# Patient Record
Sex: MALE | Race: WHITE | Employment: OTHER | URBAN - METROPOLITAN AREA
[De-identification: names, ages, dates, MRNs, and addresses within clinical notes are randomized per-mention and may not be internally consistent; named-entity substitution may affect disease eponyms.]

---

## 2018-04-10 ENCOUNTER — HOSPITAL ENCOUNTER (OUTPATIENT)
Dept: GENERAL RADIOLOGY | Age: 73
Discharge: HOME | End: 2018-04-12
Payer: MEDICARE

## 2018-04-10 ENCOUNTER — HOSPITAL ENCOUNTER (OUTPATIENT)
Age: 73
Discharge: HOME | End: 2018-04-12
Payer: MEDICARE

## 2018-04-10 DIAGNOSIS — M54.40 ACUTE RIGHT-SIDED LOW BACK PAIN WITH SCIATICA, SCIATICA LATERALITY UNSPECIFIED: ICD-10-CM

## 2018-04-10 PROCEDURE — 72110 X-RAY EXAM L-2 SPINE 4/>VWS: CPT

## 2018-05-04 ENCOUNTER — APPOINTMENT (OUTPATIENT)
Dept: CT IMAGING | Age: 73
DRG: 291 | End: 2018-05-04
Payer: MEDICARE

## 2018-05-04 ENCOUNTER — HOSPITAL ENCOUNTER (INPATIENT)
Age: 73
LOS: 1 days | Discharge: HOME | DRG: 291 | End: 2018-05-07
Attending: EMERGENCY MEDICINE | Admitting: INTERNAL MEDICINE
Payer: MEDICARE

## 2018-05-04 DIAGNOSIS — N17.9 AKI (ACUTE KIDNEY INJURY) (HCC): ICD-10-CM

## 2018-05-04 DIAGNOSIS — R77.8 ELEVATED TROPONIN: ICD-10-CM

## 2018-05-04 DIAGNOSIS — R07.89 OTHER CHEST PAIN: Primary | ICD-10-CM

## 2018-05-04 DIAGNOSIS — R06.00 DYSPNEA, UNSPECIFIED TYPE: ICD-10-CM

## 2018-05-04 PROBLEM — R07.9 CHEST PAIN: Status: ACTIVE | Noted: 2018-05-04

## 2018-05-04 LAB
ANION GAP SERPL CALCULATED.3IONS-SCNC: 15 MMOL/L (ref 7–16)
BASOPHILS ABSOLUTE: 0.07 E9/L (ref 0–0.2)
BASOPHILS RELATIVE PERCENT: 1.1 % (ref 0–2)
BUN BLDV-MCNC: 27 MG/DL (ref 8–23)
CALCIUM SERPL-MCNC: 9.4 MG/DL (ref 8.6–10.2)
CHLORIDE BLD-SCNC: 97 MMOL/L (ref 98–107)
CK MB: 2.6 NG/ML (ref 0–7.7)
CO2: 26 MMOL/L (ref 22–29)
CREAT SERPL-MCNC: 1.6 MG/DL (ref 0.7–1.2)
D DIMER: 474 NG/ML DDU
EKG ATRIAL RATE: 69 BPM
EKG P AXIS: 94 DEGREES
EKG P-R INTERVAL: 202 MS
EKG Q-T INTERVAL: 438 MS
EKG QRS DURATION: 150 MS
EKG QTC CALCULATION (BAZETT): 469 MS
EKG R AXIS: -63 DEGREES
EKG T AXIS: -26 DEGREES
EKG VENTRICULAR RATE: 69 BPM
EOSINOPHILS ABSOLUTE: 0.32 E9/L (ref 0.05–0.5)
EOSINOPHILS RELATIVE PERCENT: 4.9 % (ref 0–6)
GFR AFRICAN AMERICAN: 51
GFR NON-AFRICAN AMERICAN: 42 ML/MIN/1.73
GLUCOSE BLD-MCNC: 115 MG/DL (ref 74–109)
HCT VFR BLD CALC: 41.2 % (ref 37–54)
HEMOGLOBIN: 13.5 G/DL (ref 12.5–16.5)
IMMATURE GRANULOCYTES #: 0.02 E9/L
IMMATURE GRANULOCYTES %: 0.3 % (ref 0–5)
LYMPHOCYTES ABSOLUTE: 0.59 E9/L (ref 1.5–4)
LYMPHOCYTES RELATIVE PERCENT: 9.1 % (ref 20–42)
MAGNESIUM: 1.5 MG/DL (ref 1.6–2.6)
MCH RBC QN AUTO: 30.1 PG (ref 26–35)
MCHC RBC AUTO-ENTMCNC: 32.8 % (ref 32–34.5)
MCV RBC AUTO: 91.8 FL (ref 80–99.9)
METER GLUCOSE: 105 MG/DL (ref 70–110)
METER GLUCOSE: 119 MG/DL (ref 70–110)
MONOCYTES ABSOLUTE: 0.76 E9/L (ref 0.1–0.95)
MONOCYTES RELATIVE PERCENT: 11.7 % (ref 2–12)
NEUTROPHILS ABSOLUTE: 4.74 E9/L (ref 1.8–7.3)
NEUTROPHILS RELATIVE PERCENT: 72.9 % (ref 43–80)
PDW BLD-RTO: 13.4 FL (ref 11.5–15)
PLATELET # BLD: 190 E9/L (ref 130–450)
PMV BLD AUTO: 10.1 FL (ref 7–12)
POTASSIUM REFLEX MAGNESIUM: 4.2 MMOL/L (ref 3.5–5)
PRO-BNP: 1540 PG/ML (ref 0–125)
RBC # BLD: 4.49 E12/L (ref 3.8–5.8)
RBC # BLD: NORMAL 10*6/UL
SODIUM BLD-SCNC: 138 MMOL/L (ref 132–146)
TOTAL CK: 91 U/L (ref 20–200)
TROPONIN: 0.04 NG/ML (ref 0–0.03)
TROPONIN: 0.05 NG/ML (ref 0–0.03)
WBC # BLD: 6.5 E9/L (ref 4.5–11.5)

## 2018-05-04 PROCEDURE — 99285 EMERGENCY DEPT VISIT HI MDM: CPT

## 2018-05-04 PROCEDURE — 83880 ASSAY OF NATRIURETIC PEPTIDE: CPT

## 2018-05-04 PROCEDURE — APPSS60 APP SPLIT SHARED TIME 46-60 MINUTES: Performed by: NURSE PRACTITIONER

## 2018-05-04 PROCEDURE — 85378 FIBRIN DEGRADE SEMIQUANT: CPT

## 2018-05-04 PROCEDURE — 93005 ELECTROCARDIOGRAM TRACING: CPT | Performed by: EMERGENCY MEDICINE

## 2018-05-04 PROCEDURE — 6360000002 HC RX W HCPCS: Performed by: NURSE PRACTITIONER

## 2018-05-04 PROCEDURE — G0378 HOSPITAL OBSERVATION PER HR: HCPCS

## 2018-05-04 PROCEDURE — 80048 BASIC METABOLIC PNL TOTAL CA: CPT

## 2018-05-04 PROCEDURE — 82962 GLUCOSE BLOOD TEST: CPT

## 2018-05-04 PROCEDURE — 6370000000 HC RX 637 (ALT 250 FOR IP): Performed by: INTERNAL MEDICINE

## 2018-05-04 PROCEDURE — 82553 CREATINE MB FRACTION: CPT

## 2018-05-04 PROCEDURE — 85025 COMPLETE CBC W/AUTO DIFF WBC: CPT

## 2018-05-04 PROCEDURE — 84484 ASSAY OF TROPONIN QUANT: CPT

## 2018-05-04 PROCEDURE — 36415 COLL VENOUS BLD VENIPUNCTURE: CPT

## 2018-05-04 PROCEDURE — 82550 ASSAY OF CK (CPK): CPT

## 2018-05-04 PROCEDURE — 99223 1ST HOSP IP/OBS HIGH 75: CPT | Performed by: INTERNAL MEDICINE

## 2018-05-04 PROCEDURE — 71250 CT THORAX DX C-: CPT

## 2018-05-04 PROCEDURE — 6360000002 HC RX W HCPCS: Performed by: INTERNAL MEDICINE

## 2018-05-04 PROCEDURE — 2580000003 HC RX 258: Performed by: INTERNAL MEDICINE

## 2018-05-04 PROCEDURE — 83735 ASSAY OF MAGNESIUM: CPT

## 2018-05-04 RX ORDER — ATENOLOL 50 MG/1
50 TABLET ORAL 2 TIMES DAILY
Status: DISCONTINUED | OUTPATIENT
Start: 2018-05-04 | End: 2018-05-05

## 2018-05-04 RX ORDER — ACETAMINOPHEN 325 MG/1
650 TABLET ORAL EVERY 4 HOURS PRN
Status: DISCONTINUED | OUTPATIENT
Start: 2018-05-04 | End: 2018-05-07 | Stop reason: HOSPADM

## 2018-05-04 RX ORDER — ATENOLOL 50 MG/1
50 TABLET ORAL DAILY
Status: DISCONTINUED | OUTPATIENT
Start: 2018-05-04 | End: 2018-05-04

## 2018-05-04 RX ORDER — HEPARIN SODIUM 10000 [USP'U]/ML
5000 INJECTION, SOLUTION INTRAVENOUS; SUBCUTANEOUS EVERY 12 HOURS
Status: DISCONTINUED | OUTPATIENT
Start: 2018-05-04 | End: 2018-05-07 | Stop reason: HOSPADM

## 2018-05-04 RX ORDER — ONDANSETRON 2 MG/ML
4 INJECTION INTRAMUSCULAR; INTRAVENOUS EVERY 6 HOURS PRN
Status: DISCONTINUED | OUTPATIENT
Start: 2018-05-04 | End: 2018-05-07 | Stop reason: HOSPADM

## 2018-05-04 RX ORDER — PANTOPRAZOLE SODIUM 40 MG/1
40 TABLET, DELAYED RELEASE ORAL
Status: DISCONTINUED | OUTPATIENT
Start: 2018-05-05 | End: 2018-05-07 | Stop reason: HOSPADM

## 2018-05-04 RX ORDER — SODIUM CHLORIDE 0.9 % (FLUSH) 0.9 %
10 SYRINGE (ML) INJECTION PRN
Status: DISCONTINUED | OUTPATIENT
Start: 2018-05-04 | End: 2018-05-07 | Stop reason: HOSPADM

## 2018-05-04 RX ORDER — NICOTINE POLACRILEX 4 MG
15 LOZENGE BUCCAL PRN
Status: DISCONTINUED | OUTPATIENT
Start: 2018-05-04 | End: 2018-05-07 | Stop reason: HOSPADM

## 2018-05-04 RX ORDER — ACETAMINOPHEN 325 MG/1
650 TABLET ORAL EVERY 4 HOURS PRN
Status: CANCELLED | OUTPATIENT
Start: 2018-05-04

## 2018-05-04 RX ORDER — DEXTROSE MONOHYDRATE 50 MG/ML
100 INJECTION, SOLUTION INTRAVENOUS PRN
Status: DISCONTINUED | OUTPATIENT
Start: 2018-05-04 | End: 2018-05-07 | Stop reason: HOSPADM

## 2018-05-04 RX ORDER — LISINOPRIL 20 MG/1
40 TABLET ORAL DAILY
Status: DISCONTINUED | OUTPATIENT
Start: 2018-05-05 | End: 2018-05-05

## 2018-05-04 RX ORDER — PRAVASTATIN SODIUM 20 MG
80 TABLET ORAL NIGHTLY
Status: DISCONTINUED | OUTPATIENT
Start: 2018-05-04 | End: 2018-05-07 | Stop reason: HOSPADM

## 2018-05-04 RX ORDER — CHLORTHALIDONE 25 MG/1
25 TABLET ORAL 2 TIMES DAILY
Status: DISCONTINUED | OUTPATIENT
Start: 2018-05-04 | End: 2018-05-05

## 2018-05-04 RX ORDER — MAGNESIUM SULFATE IN WATER 40 MG/ML
2 INJECTION, SOLUTION INTRAVENOUS ONCE
Status: COMPLETED | OUTPATIENT
Start: 2018-05-04 | End: 2018-05-04

## 2018-05-04 RX ORDER — ATENOLOL AND CHLORTHALIDONE TABLET 50; 25 MG/1; MG/1
1 TABLET ORAL DAILY
Status: DISCONTINUED | OUTPATIENT
Start: 2018-05-04 | End: 2018-05-04 | Stop reason: CLARIF

## 2018-05-04 RX ORDER — ALLOPURINOL 300 MG/1
300 TABLET ORAL DAILY
Status: DISCONTINUED | OUTPATIENT
Start: 2018-05-04 | End: 2018-05-07 | Stop reason: HOSPADM

## 2018-05-04 RX ORDER — SODIUM CHLORIDE 0.9 % (FLUSH) 0.9 %
10 SYRINGE (ML) INJECTION PRN
Status: CANCELLED | OUTPATIENT
Start: 2018-05-04

## 2018-05-04 RX ORDER — NITROGLYCERIN 0.4 MG/1
0.4 TABLET SUBLINGUAL EVERY 5 MIN PRN
Status: DISCONTINUED | OUTPATIENT
Start: 2018-05-04 | End: 2018-05-07 | Stop reason: HOSPADM

## 2018-05-04 RX ORDER — SODIUM CHLORIDE 0.9 % (FLUSH) 0.9 %
10 SYRINGE (ML) INJECTION EVERY 12 HOURS SCHEDULED
Status: DISCONTINUED | OUTPATIENT
Start: 2018-05-04 | End: 2018-05-07 | Stop reason: HOSPADM

## 2018-05-04 RX ORDER — CHLORTHALIDONE 25 MG/1
25 TABLET ORAL DAILY
Status: DISCONTINUED | OUTPATIENT
Start: 2018-05-04 | End: 2018-05-04

## 2018-05-04 RX ORDER — DEXTROSE MONOHYDRATE 25 G/50ML
12.5 INJECTION, SOLUTION INTRAVENOUS PRN
Status: DISCONTINUED | OUTPATIENT
Start: 2018-05-04 | End: 2018-05-07 | Stop reason: HOSPADM

## 2018-05-04 RX ORDER — GABAPENTIN 300 MG/1
300 CAPSULE ORAL 3 TIMES DAILY
Status: DISCONTINUED | OUTPATIENT
Start: 2018-05-04 | End: 2018-05-05

## 2018-05-04 RX ORDER — SODIUM CHLORIDE 0.9 % (FLUSH) 0.9 %
10 SYRINGE (ML) INJECTION EVERY 12 HOURS SCHEDULED
Status: CANCELLED | OUTPATIENT
Start: 2018-05-04

## 2018-05-04 RX ADMIN — CHLORTHALIDONE 25 MG: 25 TABLET ORAL at 22:01

## 2018-05-04 RX ADMIN — METFORMIN HYDROCHLORIDE 500 MG: 500 TABLET ORAL at 18:43

## 2018-05-04 RX ADMIN — PRAVASTATIN SODIUM 80 MG: 20 TABLET ORAL at 21:25

## 2018-05-04 RX ADMIN — ATENOLOL 50 MG: 50 TABLET ORAL at 22:01

## 2018-05-04 RX ADMIN — HEPARIN SODIUM 5000 UNITS: 10000 INJECTION, SOLUTION INTRAVENOUS; SUBCUTANEOUS at 17:13

## 2018-05-04 RX ADMIN — GABAPENTIN 300 MG: 300 CAPSULE ORAL at 21:24

## 2018-05-04 RX ADMIN — Medication 10 ML: at 21:25

## 2018-05-04 RX ADMIN — MAGNESIUM SULFATE HEPTAHYDRATE 2 G: 40 INJECTION, SOLUTION INTRAVENOUS at 15:27

## 2018-05-04 ASSESSMENT — PAIN SCALES - GENERAL
PAINLEVEL_OUTOF10: 0

## 2018-05-04 NOTE — CONSULTS
to admit:  Prior to Admission medications    Medication Sig Start Date End Date Taking? Authorizing Provider   gabapentin (NEURONTIN) 300 MG capsule Take 300 mg by mouth 3 times daily   Yes Historical Provider, MD   pravastatin (PRAVACHOL) 80 MG tablet 80 mg daily. 9/16/14  Yes Historical Provider, MD   lisinopril (PRINIVIL;ZESTRIL) 40 MG tablet Take 40 mg by mouth daily. Yes Historical Provider, MD   atenolol-chlorthalidone (TENORETIC) 50-25 MG per tablet Take 1.5 tablets by mouth 2 times daily    Yes Historical Provider, MD   lansoprazole (PREVACID) 15 MG capsule Take 15 mg by mouth daily. Yes Historical Provider, MD   metformin (GLUCOPHAGE) 500 MG tablet Take 500 mg by mouth 2 times daily (with meals). Yes Historical Provider, MD   allopurinol (ZYLOPRIM) 300 MG tablet Take 300 mg by mouth daily. Yes Historical Provider, MD   aspirin 325 MG EC tablet Take 325 mg by mouth daily. Yes Historical Provider, MD       Current Medications:    No current facility-administered medications for this encounter. Allergies:  Lipitor [atorvastatin calcium]: myalgias    Social History:    Tobacco: 1 1/2 ppd x 15 years quit in 8791  Denies ETOH/Illicit Drugs  Caffeine: 1-2 cups coffee a day  Activity: Lives with wife in [facility] home no basement. Ambulates by holding on to things at home, and uses scooter when outside house due to back, neck and hip pain. Continues to drive. Retired maintence supervisor      Family History: Non contributory secondary to age      REVIEW OF SYSTEMS:     · Constitutional: Denies fatigue, fevers, chills or night sweats  · Eyes: Denies visual changes or drainage  · ENT: Denies headaches or hearing loss. No mouth sores or sore throat. No epistaxis   · Cardiovascular: Denies chest pain, pressure or palpitations. No lower extremity swelling. · Respiratory: + SOB. Chronic moist cough, + orthopnea. DeniesPND. No hemoptysis   · Gastrointestinal: Denies hematemesis or anorexia.  No hematochezia or melena    · Genitourinary: Denies urgency, dysuria or hematuria. · Musculoskeletal: + legs hurt with ambulation. +gait disturbance, +joint complaints  · Integumentary: Denies rash, hives or pruritis   · Neurological: Denies dizziness, headaches or seizures. No numbness or tingling  · Psychiatric: Denies anxiety or depression. · Endocrine: Denies temperature intolerance. No recent weight change. .  · Hematologic/Lymphatic: Denies abnormal bruising or bleeding. No swollen lymph nodes    PHYSICAL EXAM:   BP (!) 176/78   Pulse 62   Temp 98.2 °F (36.8 °C) (Oral)   Resp 16   Ht 5' 10\" (1.778 m)   Wt 251 lb (113.9 kg)   SpO2 100%   BMI 36.01 kg/m²   CONST:  Well developed, well nourished obese elderly  male who appears of stated age. Awake, alert and cooperative. No apparent distress. HEENT:   Head- Normocephalic, atraumatic   Eyes- Conjunctivae pink, anicteric  Throat- Oral mucosa pink and moist  Neck-  No stridor, trachea midline, no jugular venous distention. No carotid bruit. CHEST: Chest symmetrical and non-tender to palpation. No accessory muscle use or intercostal retractions  RESPIRATORY: Lung sounds - clear throughout fields   CARDIOVASCULAR:     Heart Inspection- shows no noted pulsations  Heart Palpation- no heaves or thrills; PMI is non-displaced   Heart Ausculation- Regular rate and rhythm, no murmur. No s3, s4 or rub   PV: No lower extremity edema. No varicosities. Pedal pulses palpable, no clubbing or cyanosis   ABDOMEN: Soft, obese, non-tender to light palpation. Bowel sounds present. No palpable masses; no abdominal bruit  MS: Good muscle strength and tone. No atrophy or abnormal movements. : Deferred  SKIN: Warm and dry no statis dermatitis or ulcers   NEURO / PSYCH: Oriented to person, place and time. Speech clear and appropriate. Follows all commands. Pleasant affect     DATA:    ECG 5/4/2018: SR rate 69. LAD. RBBB. Old inferior infarct pattern.   Tele strips:

## 2018-05-04 NOTE — CONSULTS
Pulmonary Consultation    Admit Date: 5/4/2018    Requesting Physician: Alexia Silver MD    CC:     HPI:  This is a 67 y.o. male who presented with with history of diabetes, hypertension, coronary artery disease, hiatal hernia, GERD, presents with shortness of breath 1 day. Patient says that he has been waking up especially last night feeling suffocating. His abdomen is bloated. He denies any chest pain, no coughing, except with allergies. Patient denies wheezing. No palpitations. He felt he just cannot take a deep breath. He has no fevers, no chills, no shakes. Patient had sweats this morning. No nausea, vomiting, diarrhea. No one is sick at home  Patient has a history of hiatal hernia and says that he knows how it feels when his hiatal hernia is acting up. He did have beans for supper last night. Patient has history of travel. 4/3. He drove home from [place]. And then last weekend had another six-hour travel from to [place]  Denies any swelling of his legs  Wife also said the patient was on the tractor all day yesterday and was moving around quite a bit  He has chronic back problems and does use a scooter. He walks bent over and holds onto things when he walks. BP elevated as high as 203/85, when he came in. Saturation was 98% on room air.  Patient was not tachycardic, but is on Tenormin      PMH:    Past Medical History:   Diagnosis Date    CAD (coronary artery disease) 2005    Stent placement in the PDA with TAXUS Stent    Hypertension     PVCs (premature ventricular contractions) 2011    occasional    Ventricular ectopy 2010   GERD, patient was worked up for having pressure in his throat was found to have hiatal hernia  History of sinusitis  Sliding hiatal hernia  Lower extremity arteries showing possible stenosis between the right CFA and SFA  60% stenosis in both internal carotids  Echo 4/28/2017 showing EF 50%, mild MR, stage I DD  Right eye vein occlusion   CAD with PTCA 2005  Last thyroid disease. Has history of diabetes. No history of MI, history of peptic ulcer disease. No history of kidney stones, though he does complain of having pain on the left side under his back going down to his pelvis. He said 1st he was on the right side then it went to the left side. He denies any history of having stones in the past. He has fractured a rib on his left side last year    He has no history of prostate problems. He has arthritis. He is supposed to use a walker. He does have a scooter does not use a cane. No history of skin cancer or any cancers on. Respiratory ROS: positive for - pleuritic pain and shortness of breath Otherwise, a complete review of systems is undertaken and is negative. Physical Examination    Vitals:  VITALS:  BP (!) 194/88   Pulse 65   Temp 98.1 °F (36.7 °C) (Oral)   Resp 14   Ht 5' 10\" (1.778 m)   Wt 259 lb (117.5 kg)   SpO2 99%   BMI 37.16 kg/m²   24HR INTAKE/OUTPUT:  No intake or output data in the 24 hours ending 05/04/18 1655      General: No distress. Alert. Heavy built  Eyes: PERRL. No sclera icterus. ENT: No discharge. Pharynx clear. Nasal septum midline   Neck: Trachea midline. Normal thyroid. no JVD  Resp: No accessory muscle use. No crackles. No wheezing. No rhonchi. Symmetrical exansion  CV: PMI non displaced. Regular rate. Regular rhythm. No mumur or rub. ABD: Non-tender. Non-distended. No organmegaly. Normal bowel sounds. Skin: Warm and dry. No rash on exposed extremities. Lymph: No cervical LAD. No supraclavicular LAD. Ext: No joint deformity. No clubbing. No cyanosis. No edema  Neuro: Awake. Follows commands. No focal deficits.   Moves all ext     Lab Results:    CBC:   Recent Labs      05/04/18   1054   WBC  6.5   HGB  13.5   HCT  41.2   MCV  91.8   PLT  190     BMP:   Recent Labs      05/04/18   1054   NA  138   K  4.2   CL  97*   CO2  26   BUN  27*   CREATININE  1.6*      ALB:3,BILIDIR:3,BILITOT:3,ALKPHOS:3)@  PT/INR: No results for input(s): PROTIME, INR in the last 72 hours. Cultures:  CT chest dated 5/4/2018 was reviewed by myself and shows Lung fields are normal. Mild right hydronephrosis, left kidney cyst        Assessment/Plan:    Comment: This is a 67y.o. year old male who presented with Shortness of breath at night with bloating sensation with history of travel and coronary artery disease    Due to elevated creatinine. CT scan without contrast was done showing right mild hydronephrosis      1. Shortness of breath/ PND   2. Acute vs chronic renal failure, unknown baseline  3. Mildly elevated troponin in the face of renal failure  4. Left flank pain with h/o chronic back pain with compression fractures, degenerative disc disease with history of laminectomy L2-L5  5. Acute on chronic CHF  6. CAD with history of PCI 2005  7. Obesity      Will check d-dimer  Low Well's criteria score for PE  If this is elevated, will workup further with a VQ scan and ultrasound lower extremities with history of travel  If negative, most likely this may be a cardiac problem or bloating due to GERD/hiatal hernia  Patient may need urology to evaluate the hydronephrosis      Thanks for letting us see this patient in consultation. Please contact us with any questions.       Electronically signed by Cari De Luna MD on 5/4/2018 at 4:55 PM

## 2018-05-04 NOTE — PLAN OF CARE
Problem: Respiratory Function - Compromised:  Goal: Able to breathe comfortably  Outcome: Met This Shift

## 2018-05-04 NOTE — CONSULTS
Reason for consult : Short of breath, Elevated Troponin    I personally saw, examined, and evaluated the patient today. I personally reviewed medications, rhythm strips, pertinent labs and test reports. I directly participated in the medical-decision making and medication adjustment. EKG reviewed; Focused cardiac exam:    Denies any CP or SOB,     Vitals:    05/04/18    BP: 176/78   Pulse: 68   Resp:    Temp: 98.1 °F (36.7 °C)   SpO2: 99%         Neck: No elevated JVP, no carotid bruit  Heart:  Regular, no S3, no rub, 1/6 systolic murmur  Lungs: Clear except few scattered rhonchi  Ext: No edema      Impression/Recommendations:      Borderline elevation of troponin: Possible due to CKD; Cycle enzymes, If no further elevation, Lexiscan stress tomorrow; If recurrent CP, EKG changes, elevated enzymes, need cardiac cath    Acute on chronic CHF: Lasix 40mg iv today; Monitor I/Os, daily wts, Renal Fn; Check Echo    CAD: s/p PCI of PDA in 2005; Continue ASA, BB, Statins;     CKD: Monitor renal fn, May need decreasing his ACE-I dose    Essential HTN: resume home BP medications, Monitor BP     VHD: Mild MR, TR, AR    Non morbid obesity: Diet, exercise and weight loss discussed. D/W family, wife, and all questions answered. Thank you for the consult. Full consult notes to follow.       Magi Smith MD  [facility]

## 2018-05-04 NOTE — ED NOTES
[facility] faxed, spoke to Feli. Pt to be transported by [facility] momentarily.       Laura Mantilla RN  05/04/18 [id number]

## 2018-05-04 NOTE — ED PROVIDER NOTES
Department of Emergency Medicine   ED  Provider Note  Admit Date/RoomTime: 5/4/2018 10:10 AM  ED Room: 7852/7878-O      History of Present Illness:  5/4/18, Time: 10:48 AM         Florentin Park is a 67 y.o. male presenting to the ED for shortness of breath, beginning last night. The complaint has been persistent, moderate in severity, and worsened by nothing. Pt states that he was up all night because he could not breath and was having a hard time getting a big breath to relax. He reports that he has had the same symptoms a week and a half ago but did not think anything of it as they only lasted a couple hours. Pt is also having back pain on his left side that began a week and a half ago. Pt has sciatica that he notes is getting worse. Pt returned from [place] a month ago and drove to [place] a week ago. Pt was seen at his PCP today and was told to come in to be evaluated. He reports that he feels good right now. Pt denies LOC, HA, CP, abdominal pain, n/v/d, fever, chills, dizziness or other general complaints. Review of Systems:   Pertinent positives and negatives are stated within HPI, all other systems reviewed and are negative.    --------------------------------------------- PAST HISTORY ---------------------------------------------  Past Medical History:  has a past medical history of CAD (coronary artery disease); Hypertension; PVCs (premature ventricular contractions); and Ventricular ectopy. Past Surgical History:  has a past surgical history that includes back surgery (2004); cervical fusion (2004); Cardiac surgery (2005); Diagnostic Cardiac Cath Lab Procedure (1993); Diagnostic Cardiac Cath Lab Procedure (12/27/2005); doppler echocardiography (4/6/02); doppler echocardiography (9/13/04); cardiovascular stress test (12/5/05); and cardiovascular stress test (2/20/07). Social History:  reports that he quit smoking about 41 years ago. He started smoking about 56 years ago.  He smoked 1.50 packs per day. He has never used smokeless tobacco. He reports that he does not drink alcohol or use drugs. Family History: family history includes Cancer in his paternal grandfather. The patients home medications have been reviewed. Allergies: Lipitor [atorvastatin calcium]    ---------------------------------------------------PHYSICAL EXAM--------------------------------------    Constitutional/General: Alert and oriented x3, well appearing, non toxic in NAD  Head: Normocephalic and atraumatic  Eyes: PERRL, EOMI, conjunctiva normal, sclera non icteric  Mouth: Oropharynx clear  Neck: Supple  Respiratory: Lungs clear to auscultation bilaterally, no wheezes, rales, or rhonchi. Not in respiratory distress  Cardiovascular:  Regular rate. Regular rhythm. No murmurs, gallops, or rubs. 2+ distal pulses  Chest: No chest wall tenderness  GI:  Abdomen Soft, Non tender, Non distended. +BS. No rebound, guarding, or rigidity. No pulsatile masses. Musculoskeletal: Moves all extremities x 4. Warm and well perfused, no clubbing, cyanosis, or edema. Integument: Skin warm and dry. Neurologic: GCS 15, no focal deficits, symmetric strength 5/5 in the upper and lower extremities bilaterally  Psychiatric: Normal Affect    -------------------------------------------------- RESULTS -------------------------------------------------  I have personally reviewed all laboratory and imaging results for this patient. Results are listed below.      LABS:  Results for orders placed or performed during the hospital encounter of 05/04/18   CBC auto differential   Result Value Ref Range    WBC 6.5 4.5 - 11.5 E9/L    RBC 4.49 3.80 - 5.80 E12/L    Hemoglobin 13.5 12.5 - 16.5 g/dL    Hematocrit 41.2 37.0 - 54.0 %    MCV 91.8 80.0 - 99.9 fL    MCH 30.1 26.0 - 35.0 pg    MCHC 32.8 32.0 - 34.5 %    RDW 13.4 11.5 - 15.0 fL    Platelets 167 634 - 916 E9/L    MPV 10.1 7.0 - 12.0 fL    Neutrophils % 72.9 43.0 - 80.0 %    Immature Granulocytes % 0.3 0.0 - 5.0 %    Lymphocytes % 9.1 (L) 20.0 - 42.0 %    Monocytes % 11.7 2.0 - 12.0 %    Eosinophils % 4.9 0.0 - 6.0 %    Basophils % 1.1 0.0 - 2.0 %    Neutrophils # 4.74 1.80 - 7.30 E9/L    Immature Granulocytes # 0.02 E9/L    Lymphocytes # 0.59 (L) 1.50 - 4.00 E9/L    Monocytes # 0.76 0.10 - 0.95 E9/L    Eosinophils # 0.32 0.05 - 0.50 E9/L    Basophils # 0.07 0.00 - 0.20 E9/L    RBC Morphology Normal    Basic Metabolic Panel w/ Reflex to MG   Result Value Ref Range    Sodium 138 132 - 146 mmol/L    Potassium reflex Magnesium 4.2 3.5 - 5.0 mmol/L    Chloride 97 (L) 98 - 107 mmol/L    CO2 26 22 - 29 mmol/L    Anion Gap 15 7 - 16 mmol/L    Glucose 115 (H) 74 - 109 mg/dL    BUN 27 (H) 8 - 23 mg/dL    CREATININE 1.6 (H) 0.7 - 1.2 mg/dL    GFR Non-African American 42 >=60 mL/min/1.73    GFR African American 51     Calcium 9.4 8.6 - 10.2 mg/dL   Troponin   Result Value Ref Range    Troponin 0.05 (H) 0.00 - 0.03 ng/mL   Troponin   Result Value Ref Range    Troponin 0.04 (H) 0.00 - 0.03 ng/mL   Troponin   Result Value Ref Range    Troponin 0.04 (H) 0.00 - 0.03 ng/mL   CK   Result Value Ref Range    Total CK 91 20 - 200 U/L   CK   Result Value Ref Range    Total CK 93 20 - 200 U/L   CK-MB   Result Value Ref Range    CK-MB 2.6 0.0 - 7.7 ng/mL   CK-MB   Result Value Ref Range    CK-MB 2.3 0.0 - 7.7 ng/mL   Brain Natriuretic Peptide   Result Value Ref Range    Pro-BNP 1,540 (H) 0 - 125 pg/mL   Magnesium   Result Value Ref Range    Magnesium 1.5 (L) 1.6 - 2.6 mg/dL   D-dimer, quantitative   Result Value Ref Range    D-Dimer, Quant 474 ng/mL DDU   Lipid panel   Result Value Ref Range    Cholesterol, Total 125 0 - 199 mg/dL    Triglycerides 185 (H) 0 - 149 mg/dL    HDL 27 >40 mg/dL    LDL Calculated 61 0 - 99 mg/dL    VLDL Cholesterol Calculated 37 mg/dL   TSH without Reflex   Result Value Ref Range    TSH 5.550 (H) 0.270 - 4.200 uIU/mL   T4, Free   Result Value Ref Range    T4 Free 1.32 0.93 - 1.70 ng/dL   Hemoglobin A1C   Result Value Ref Range    Hemoglobin A1C 6.1 (H) 4.8 - 5.9 %   CBC auto differential   Result Value Ref Range    WBC 6.9 4.5 - 11.5 E9/L    RBC 4.40 3.80 - 5.80 E12/L    Hemoglobin 13.2 12.5 - 16.5 g/dL    Hematocrit 39.3 37.0 - 54.0 %    MCV 89.3 80.0 - 99.9 fL    MCH 30.0 26.0 - 35.0 pg    MCHC 33.6 32.0 - 34.5 %    RDW 13.4 11.5 - 15.0 fL    Platelets 017 559 - 379 E9/L    MPV 10.3 7.0 - 12.0 fL    Neutrophils % 74.7 43.0 - 80.0 %    Immature Granulocytes % 0.3 0.0 - 5.0 %    Lymphocytes % 8.2 (L) 20.0 - 42.0 %    Monocytes % 11.4 2.0 - 12.0 %    Eosinophils % 4.5 0.0 - 6.0 %    Basophils % 0.9 0.0 - 2.0 %    Neutrophils # 5.14 1.80 - 7.30 E9/L    Immature Granulocytes # 0.02 E9/L    Lymphocytes # 0.56 (L) 1.50 - 4.00 E9/L    Monocytes # 0.78 0.10 - 0.95 E9/L    Eosinophils # 0.31 0.05 - 0.50 E9/L    Basophils # 0.06 0.00 - 0.20 E9/L    Anisocytosis 1+    Protime-INR   Result Value Ref Range    Protime 13.2 (H) 9.3 - 12.4 sec    INR 1.2    APTT   Result Value Ref Range    aPTT 31.3 24.5 - 35.1 sec   Basic Metabolic Panel   Result Value Ref Range    Sodium 135 132 - 146 mmol/L    Potassium 3.5 3.5 - 5.0 mmol/L    Chloride 94 (L) 98 - 107 mmol/L    CO2 23 22 - 29 mmol/L    Anion Gap 18 (H) 7 - 16 mmol/L    Glucose 119 (H) 74 - 109 mg/dL    BUN 28 (H) 8 - 23 mg/dL    CREATININE 1.6 (H) 0.7 - 1.2 mg/dL    GFR Non-African American 42 >=60 mL/min/1.73    GFR African American 51     Calcium 9.2 8.6 - 10.2 mg/dL   Urinalysis   Result Value Ref Range    Color, UA Yellow Straw/Yellow    Clarity, UA Clear Clear    Glucose, Ur Negative Negative mg/dL    Bilirubin Urine Negative Negative    Ketones, Urine Negative Negative mg/dL    Specific Gravity, UA 1.025 1.005 - 1.030    Blood, Urine SMALL (A) Negative    pH, UA 5.5 5.0 - 9.0    Protein,  (A) Negative mg/dL    Urobilinogen, Urine 0.2 <2.0 E.U./dL    Nitrite, Urine Negative Negative    Leukocyte Esterase, Urine Negative Negative   Chloride, urine, random   Result Value Ref Range    Chloride 61 Not Established mmol/L   Sodium, urine, random   Result Value Ref Range    Sodium, Ur 62 Not Established mmol/L   PROTEIN / CREATININE RATIO, URINE   Result Value Ref Range    Protein, Ur 225 (H) 0 - 12 mg/dL    Creatinine, Ur 100 40 - 278 mg/dL    Protein/Creat Ratio 2.3 (H) 0.0 - 0.2    Protein/Creat Ratio 2.3    Basic metabolic panel   Result Value Ref Range    Sodium 134 132 - 146 mmol/L    Potassium 3.4 (L) 3.5 - 5.0 mmol/L    Chloride 94 (L) 98 - 107 mmol/L    CO2 24 22 - 29 mmol/L    Anion Gap 16 7 - 16 mmol/L    Glucose 117 (H) 74 - 109 mg/dL    BUN 33 (H) 8 - 23 mg/dL    CREATININE 2.0 (H) 0.7 - 1.2 mg/dL    GFR Non-African American 33 >=60 mL/min/1.73    GFR African American 40     Calcium 8.8 8.6 - 10.2 mg/dL   Magnesium   Result Value Ref Range    Magnesium 1.6 1.6 - 2.6 mg/dL   Phosphorus   Result Value Ref Range    Phosphorus 3.0 2.5 - 4.5 mg/dL   Uric Acid   Result Value Ref Range    Uric Acid, Serum 5.6 3.4 - 7.0 mg/dL   Microscopic Urinalysis   Result Value Ref Range    Casts FEW /LPF    CASTS 2 MODERATE /LPF    Mucus, UA Present     WBC, UA 0-1 0 - 5 /HPF    RBC, UA 0-1 0 - 2 /HPF    Epi Cells RARE /HPF    Bacteria, UA MANY (A) /HPF    Amorphous, UA MANY    Basic Metabolic Panel   Result Value Ref Range    Sodium 135 132 - 146 mmol/L    Potassium 3.4 (L) 3.5 - 5.0 mmol/L    Chloride 94 (L) 98 - 107 mmol/L    CO2 25 22 - 29 mmol/L    Anion Gap 16 7 - 16 mmol/L    Glucose 111 (H) 74 - 109 mg/dL    BUN 35 (H) 8 - 23 mg/dL    CREATININE 2.1 (H) 0.7 - 1.2 mg/dL    GFR Non-African American 31 >=60 mL/min/1.73    GFR African American 38     Calcium 9.1 8.6 - 10.2 mg/dL   Basic metabolic panel   Result Value Ref Range    Sodium 136 132 - 146 mmol/L    Potassium 3.6 3.5 - 5.0 mmol/L    Chloride 98 98 - 107 mmol/L    CO2 23 22 - 29 mmol/L    Anion Gap 15 7 - 16 mmol/L    Glucose 159 (H) 74 - 109 mg/dL    BUN 35 (H) 8 - 23 mg/dL    CREATININE 2.1 (H) 0.7 - 1.2 mg/dL    GFR Non- hydronephrosis                                EKG:  This EKG is signed and interpreted by the EP. Time: 10:19 AM  Rate: 69  Rhythm: Sinus  Interpretation: left axis deviation, right bundle branch block, inferior infarct, T wave abnormality   Comparison: none    ------------------------- NURSING NOTES AND VITALS REVIEWED ---------------------------   The nursing notes within the ED encounter and vital signs as below have been reviewed by myself. BP (!) 122/58   Pulse 78   Temp 99 °F (37.2 °C) (Oral)   Resp 16   Ht 5' 10\" (1.778 m)   Wt 253 lb 3.2 oz (114.9 kg)   SpO2 96%   BMI 36.33 kg/m²   Oxygen Saturation Interpretation: Normal    The patients available past medical records and past encounters were reviewed. ------------------------------ ED COURSE/MEDICAL DECISION MAKING----------------------  Medications   regadenoson (LEXISCAN) injection 0.4 mg (0.4 mg Intravenous Given 5/5/18 0931)   magnesium sulfate 2 g in 50 mL IVPB premix (0 g Intravenous Stopped 5/4/18 5507)   technetium sestamibi (CARDIOLITE) injection 10 millicurie (10 millicuries Intravenous Given 5/5/18 0750)   technetium sestamibi (CARDIOLITE) injection 30 millicurie (30 millicuries Intravenous Given 5/5/18 0920)   potassium chloride (KLOR-CON M) extended release tablet 20 mEq (20 mEq Oral Given 5/6/18 1136)   potassium chloride (KLOR-CON M) extended release tablet 20 mEq (20 mEq Oral Given 5/6/18 1904)            Medical Decision Making:    Pt is a 67year old male presenting for shortness of breath beginning last night. He states he was unable to get a deep breath. Pt had the same symptoms a week and a half ago but did not think anything of it as it only lasted a couple of hours. He has sciatica pain and reports that it is getting worse. He was seen at his PCP today and was told to come in to be evaluated. Pt returned from [place] a month ago and drove to [place] a week ago. He feels good right now.  CTA chest and labs were obtained

## 2018-05-05 ENCOUNTER — APPOINTMENT (OUTPATIENT)
Dept: ULTRASOUND IMAGING | Age: 73
DRG: 291 | End: 2018-05-05
Payer: MEDICARE

## 2018-05-05 ENCOUNTER — APPOINTMENT (OUTPATIENT)
Dept: NUCLEAR MEDICINE | Age: 73
DRG: 291 | End: 2018-05-05
Payer: MEDICARE

## 2018-05-05 LAB
AMORPHOUS: ABNORMAL
ANION GAP SERPL CALCULATED.3IONS-SCNC: 18 MMOL/L (ref 7–16)
ANISOCYTOSIS: ABNORMAL
APTT: 31.3 SEC (ref 24.5–35.1)
BACTERIA: ABNORMAL /HPF
BASOPHILS ABSOLUTE: 0.06 E9/L (ref 0–0.2)
BASOPHILS RELATIVE PERCENT: 0.9 % (ref 0–2)
BILIRUBIN URINE: NEGATIVE
BLOOD, URINE: ABNORMAL
BUN BLDV-MCNC: 28 MG/DL (ref 8–23)
CALCIUM SERPL-MCNC: 9.2 MG/DL (ref 8.6–10.2)
CASTS 2: ABNORMAL /LPF
CASTS: ABNORMAL /LPF
CHLORIDE BLD-SCNC: 94 MMOL/L (ref 98–107)
CHLORIDE URINE RANDOM: 61 MMOL/L
CHOLESTEROL, TOTAL: 125 MG/DL (ref 0–199)
CK MB: 2.3 NG/ML (ref 0–7.7)
CLARITY: CLEAR
CO2: 23 MMOL/L (ref 22–29)
COLOR: YELLOW
CREAT SERPL-MCNC: 1.6 MG/DL (ref 0.7–1.2)
CREATININE URINE: 100 MG/DL (ref 40–278)
EOSINOPHILS ABSOLUTE: 0.31 E9/L (ref 0.05–0.5)
EOSINOPHILS RELATIVE PERCENT: 4.5 % (ref 0–6)
EPITHELIAL CELLS, UA: ABNORMAL /HPF
GFR AFRICAN AMERICAN: 51
GFR NON-AFRICAN AMERICAN: 42 ML/MIN/1.73
GLUCOSE BLD-MCNC: 119 MG/DL (ref 74–109)
GLUCOSE URINE: NEGATIVE MG/DL
HBA1C MFR BLD: 6.1 % (ref 4.8–5.9)
HCT VFR BLD CALC: 39.3 % (ref 37–54)
HDLC SERPL-MCNC: 27 MG/DL
HEMOGLOBIN: 13.2 G/DL (ref 12.5–16.5)
IMMATURE GRANULOCYTES #: 0.02 E9/L
IMMATURE GRANULOCYTES %: 0.3 % (ref 0–5)
INR BLD: 1.2
KETONES, URINE: NEGATIVE MG/DL
LDL CHOLESTEROL CALCULATED: 61 MG/DL (ref 0–99)
LEUKOCYTE ESTERASE, URINE: NEGATIVE
LV EF: 42 %
LV EF: 55 %
LVEF MODALITY: NORMAL
LVEF MODALITY: NORMAL
LYMPHOCYTES ABSOLUTE: 0.56 E9/L (ref 1.5–4)
LYMPHOCYTES RELATIVE PERCENT: 8.2 % (ref 20–42)
MCH RBC QN AUTO: 30 PG (ref 26–35)
MCHC RBC AUTO-ENTMCNC: 33.6 % (ref 32–34.5)
MCV RBC AUTO: 89.3 FL (ref 80–99.9)
METER GLUCOSE: 134 MG/DL (ref 70–110)
METER GLUCOSE: 148 MG/DL (ref 70–110)
METER GLUCOSE: 99 MG/DL (ref 70–110)
MONOCYTES ABSOLUTE: 0.78 E9/L (ref 0.1–0.95)
MONOCYTES RELATIVE PERCENT: 11.4 % (ref 2–12)
MUCUS: PRESENT
NEUTROPHILS ABSOLUTE: 5.14 E9/L (ref 1.8–7.3)
NEUTROPHILS RELATIVE PERCENT: 74.7 % (ref 43–80)
NITRITE, URINE: NEGATIVE
PDW BLD-RTO: 13.4 FL (ref 11.5–15)
PH UA: 5.5 (ref 5–9)
PLATELET # BLD: 215 E9/L (ref 130–450)
PMV BLD AUTO: 10.3 FL (ref 7–12)
POTASSIUM SERPL-SCNC: 3.5 MMOL/L (ref 3.5–5)
PROTEIN PROTEIN: 225 MG/DL (ref 0–12)
PROTEIN UA: 100 MG/DL
PROTEIN/CREAT RATIO: 2.3
PROTEIN/CREAT RATIO: 2.3 (ref 0–0.2)
PROTHROMBIN TIME: 13.2 SEC (ref 9.3–12.4)
RBC # BLD: 4.4 E12/L (ref 3.8–5.8)
RBC UA: ABNORMAL /HPF (ref 0–2)
SODIUM BLD-SCNC: 135 MMOL/L (ref 132–146)
SODIUM URINE: 62 MMOL/L
SPECIFIC GRAVITY UA: 1.02 (ref 1–1.03)
T4 FREE: 1.32 NG/DL (ref 0.93–1.7)
TOTAL CK: 93 U/L (ref 20–200)
TRIGL SERPL-MCNC: 185 MG/DL (ref 0–149)
TROPONIN: 0.04 NG/ML (ref 0–0.03)
TSH SERPL DL<=0.05 MIU/L-ACNC: 5.55 UIU/ML (ref 0.27–4.2)
UROBILINOGEN, URINE: 0.2 E.U./DL
VLDLC SERPL CALC-MCNC: 37 MG/DL
WBC # BLD: 6.9 E9/L (ref 4.5–11.5)
WBC UA: ABNORMAL /HPF (ref 0–5)

## 2018-05-05 PROCEDURE — 85025 COMPLETE CBC W/AUTO DIFF WBC: CPT

## 2018-05-05 PROCEDURE — 3430000000 HC RX DIAGNOSTIC RADIOPHARMACEUTICAL: Performed by: RADIOLOGY

## 2018-05-05 PROCEDURE — 85730 THROMBOPLASTIN TIME PARTIAL: CPT

## 2018-05-05 PROCEDURE — 84443 ASSAY THYROID STIM HORMONE: CPT

## 2018-05-05 PROCEDURE — 36415 COLL VENOUS BLD VENIPUNCTURE: CPT

## 2018-05-05 PROCEDURE — 84300 ASSAY OF URINE SODIUM: CPT

## 2018-05-05 PROCEDURE — 6370000000 HC RX 637 (ALT 250 FOR IP): Performed by: INTERNAL MEDICINE

## 2018-05-05 PROCEDURE — 2580000003 HC RX 258: Performed by: INTERNAL MEDICINE

## 2018-05-05 PROCEDURE — 93018 CV STRESS TEST I&R ONLY: CPT | Performed by: INTERNAL MEDICINE

## 2018-05-05 PROCEDURE — 93016 CV STRESS TEST SUPVJ ONLY: CPT | Performed by: INTERNAL MEDICINE

## 2018-05-05 PROCEDURE — 84156 ASSAY OF PROTEIN URINE: CPT

## 2018-05-05 PROCEDURE — 83036 HEMOGLOBIN GLYCOSYLATED A1C: CPT

## 2018-05-05 PROCEDURE — 81001 URINALYSIS AUTO W/SCOPE: CPT

## 2018-05-05 PROCEDURE — 6360000002 HC RX W HCPCS: Performed by: NURSE PRACTITIONER

## 2018-05-05 PROCEDURE — 99233 SBSQ HOSP IP/OBS HIGH 50: CPT | Performed by: INTERNAL MEDICINE

## 2018-05-05 PROCEDURE — 93017 CV STRESS TEST TRACING ONLY: CPT

## 2018-05-05 PROCEDURE — A9500 TC99M SESTAMIBI: HCPCS | Performed by: RADIOLOGY

## 2018-05-05 PROCEDURE — 82570 ASSAY OF URINE CREATININE: CPT

## 2018-05-05 PROCEDURE — 82436 ASSAY OF URINE CHLORIDE: CPT

## 2018-05-05 PROCEDURE — 78452 HT MUSCLE IMAGE SPECT MULT: CPT

## 2018-05-05 PROCEDURE — 6360000002 HC RX W HCPCS: Performed by: INTERNAL MEDICINE

## 2018-05-05 PROCEDURE — 85610 PROTHROMBIN TIME: CPT

## 2018-05-05 PROCEDURE — 80048 BASIC METABOLIC PNL TOTAL CA: CPT

## 2018-05-05 PROCEDURE — 93306 TTE W/DOPPLER COMPLETE: CPT

## 2018-05-05 PROCEDURE — G0378 HOSPITAL OBSERVATION PER HR: HCPCS

## 2018-05-05 PROCEDURE — 93970 EXTREMITY STUDY: CPT

## 2018-05-05 PROCEDURE — 84439 ASSAY OF FREE THYROXINE: CPT

## 2018-05-05 PROCEDURE — 80061 LIPID PANEL: CPT

## 2018-05-05 PROCEDURE — 82962 GLUCOSE BLOOD TEST: CPT

## 2018-05-05 RX ORDER — GABAPENTIN 300 MG/1
600 CAPSULE ORAL
Status: DISCONTINUED | OUTPATIENT
Start: 2018-05-06 | End: 2018-05-07 | Stop reason: HOSPADM

## 2018-05-05 RX ORDER — VALSARTAN 320 MG/1
320 TABLET ORAL DAILY
Status: DISCONTINUED | OUTPATIENT
Start: 2018-05-06 | End: 2018-05-06

## 2018-05-05 RX ORDER — METOPROLOL SUCCINATE 50 MG/1
50 TABLET, EXTENDED RELEASE ORAL 2 TIMES DAILY
Status: DISCONTINUED | OUTPATIENT
Start: 2018-05-05 | End: 2018-05-06

## 2018-05-05 RX ORDER — GABAPENTIN 300 MG/1
300 CAPSULE ORAL NIGHTLY
COMMUNITY

## 2018-05-05 RX ORDER — LEVOTHYROXINE SODIUM 0.03 MG/1
25 TABLET ORAL DAILY
Status: DISCONTINUED | OUTPATIENT
Start: 2018-05-06 | End: 2018-05-07 | Stop reason: HOSPADM

## 2018-05-05 RX ORDER — GABAPENTIN 300 MG/1
300 CAPSULE ORAL NIGHTLY
Status: DISCONTINUED | OUTPATIENT
Start: 2018-05-05 | End: 2018-05-07 | Stop reason: HOSPADM

## 2018-05-05 RX ADMIN — Medication 10 ML: at 12:34

## 2018-05-05 RX ADMIN — METOPROLOL SUCCINATE 50 MG: 50 TABLET, EXTENDED RELEASE ORAL at 21:46

## 2018-05-05 RX ADMIN — ALLOPURINOL 300 MG: 300 TABLET ORAL at 12:34

## 2018-05-05 RX ADMIN — ASPIRIN 325 MG: 325 TABLET, DELAYED RELEASE ORAL at 12:34

## 2018-05-05 RX ADMIN — METOPROLOL SUCCINATE 50 MG: 50 TABLET, EXTENDED RELEASE ORAL at 12:33

## 2018-05-05 RX ADMIN — GABAPENTIN 300 MG: 300 CAPSULE ORAL at 21:43

## 2018-05-05 RX ADMIN — HEPARIN SODIUM 5000 UNITS: 10000 INJECTION, SOLUTION INTRAVENOUS; SUBCUTANEOUS at 17:26

## 2018-05-05 RX ADMIN — PRAVASTATIN SODIUM 80 MG: 20 TABLET ORAL at 21:43

## 2018-05-05 RX ADMIN — Medication 10 ML: at 21:31

## 2018-05-05 RX ADMIN — HEPARIN SODIUM 5000 UNITS: 10000 INJECTION, SOLUTION INTRAVENOUS; SUBCUTANEOUS at 05:20

## 2018-05-05 RX ADMIN — GABAPENTIN 300 MG: 300 CAPSULE ORAL at 12:33

## 2018-05-05 RX ADMIN — METFORMIN HYDROCHLORIDE 1000 MG: 1000 TABLET ORAL at 17:26

## 2018-05-05 RX ADMIN — REGADENOSON 0.4 MG: 0.08 INJECTION, SOLUTION INTRAVENOUS at 09:31

## 2018-05-05 RX ADMIN — PANTOPRAZOLE SODIUM 40 MG: 40 TABLET, DELAYED RELEASE ORAL at 06:27

## 2018-05-05 RX ADMIN — METFORMIN HYDROCHLORIDE 500 MG: 500 TABLET ORAL at 12:34

## 2018-05-05 RX ADMIN — LISINOPRIL 40 MG: 20 TABLET ORAL at 12:34

## 2018-05-05 RX ADMIN — Medication 30 MILLICURIE: at 09:20

## 2018-05-05 RX ADMIN — Medication 10 MILLICURIE: at 07:50

## 2018-05-05 ASSESSMENT — PAIN SCALES - GENERAL
PAINLEVEL_OUTOF10: 0

## 2018-05-05 NOTE — CONSULTS
Consults   [facility] [employer]. MD Billie Pelletier MD Jeanice Slater, MD Charolotte Schmid, MD  Consultation  Patient's Name: Ronni Weaver  3:51 PM  5/5/2018    Nephrologist: Liz Garcia M.D.    Reason for Consult:  Accelerated hypertension  Requesting Physician:  Chuck Arteaga MD    Chief Complaint:  Dyspnea    History Obtained From:  Patient, chart    History of Present Ilness:    Mr. Haily oRe is a pleasant 65-year-old, with a long-standing history of hypertension, typically controlled to the 821'X - 148'D systolic he tells me on atenolol and lisinopril. On each of the 2 nights preceded his presentation of the ER he awoke in the middle of night with dyspnea, diaphoresis, anxiety. No chest pain or palpitations. Blood pressure may came in was in the 062I systolic, though increased to 203/85 mmHg while in the ER. He and his wife noted he became increasingly anxious while he was there. His treated in the ER blood pressure improved to the 150s160s on repeat checks. Atenolol has been discontinued and replaced with metoprolol. He is also received his lisinopril. Most recent blood pressure is 157/75 mmHg    Nuclear stress test revealed no reversible defects, and an LVEF of 42%. 2-D echocardiogram noted moderate LVH, mild LV dilatation. CT scan of the chest without contrast was negative for PE, though did note mild right-sided hydronephrosis. Duplex ultrasound bilateral lower extremities did not demonstrate a DVT. He notes that he has had a nonproductive cough for the past 2 years, and his wife queries whether this is due to the lisinopril. She notes also that he has allergies and postnasal drip, this is seasonal, and his cough has been persistent. No apparent GERD, or wheezing. They both note that he has not after brought this up to his physician in the past, and in fact he has not followed up with his PCP for some time.   He was evaluated in the past by Dr. Gavin Miramontes for uncontrolled hypertension, the only saw him a few times, has not followed-up for number of years. Past Medical History:   Diagnosis Date    CAD (coronary artery disease) 2005    Stent placement in the PDA with TAXUS Stent    Hypertension     PVCs (premature ventricular contractions) 2011    occasional    Ventricular ectopy 2010       Past Surgical History:   Procedure Laterality Date    BACK SURGERY  2004    Lumbar Decompression    CARDIAC SURGERY  2005    Cardiac Stent    CARDIOVASCULAR STRESS TEST  12/5/05    Moderate Risk, 1st pass suggests anterior apical wma on exercise, cannot rule out ischemia    CARDIOVASCULAR STRESS TEST  2/20/07    EF 48% with no wma, normal LV fxn, slight enlargement of LV.  CERVICAL FUSION  2004    DIAGNOSTIC CARDIAC CATH LAB PROCEDURE  1993    lAdo Flowers [id number]    DIAGNOSTIC CARDIAC CATH LAB PROCEDURE  12/27/2005    TAXUS Stent    DOPPLER ECHOCARDIOGRAPHY  4/6/02    LV dilated 6.6 cm, Mild RV enlargement, EF 45-50%    DOPPLER ECHOCARDIOGRAPHY  9/13/04    EF 55%, Concentric LVH, Left Atrial enlargement, Aortic Sclerosis. Mild MR        Family History   Problem Relation Age of Onset    Cancer Paternal Grandfather         reports that he quit smoking about 41 years ago. He started smoking about 56 years ago. He smoked 1.50 packs per day. He has never used smokeless tobacco. He reports that he does not drink alcohol or use drugs.     Allergies:  Lipitor [atorvastatin calcium]    Current Medications:      metoprolol succinate (TOPROL XL) extended release tablet 50 mg BID   [START ON 5/6/2018] levothyroxine (SYNTHROID) tablet 25 mcg Daily   perflutren lipid microspheres (DEFINITY) injection 1.65 mg ONCE PRN   lisinopril (PRINIVIL;ZESTRIL) tablet 40 mg Daily   pantoprazole (PROTONIX) tablet 40 mg QAM AC   allopurinol (ZYLOPRIM) tablet 300 mg Daily   aspirin EC tablet 325 mg Daily   pravastatin (PRAVACHOL) tablet 80 mg Nightly   gabapentin (NEURONTIN) capsule 300 mg TID   metFORMIN (GLUCOPHAGE) tablet 500 mg BID WC   sodium chloride flush 0.9 % injection 10 mL 2 times per day   sodium chloride flush 0.9 % injection 10 mL PRN   acetaminophen (TYLENOL) tablet 650 mg Q4H PRN   magnesium hydroxide (MILK OF MAGNESIA) 400 MG/5ML suspension 30 mL Daily PRN   ondansetron (ZOFRAN) injection 4 mg Q6H PRN   nitroGLYCERIN (NITROSTAT) SL tablet 0.4 mg Q5 Min PRN   heparin (porcine) injection 5,000 Units Q12H   glucose (GLUTOSE) 40 % oral gel 15 g PRN   dextrose 50 % solution 12.5 g PRN   glucagon (rDNA) injection 1 mg PRN   dextrose 5 % solution PRN       Review of Systems:   Pertinent items are noted in HPI. Physical exam:   Vital signs reviewed  Gen. : Obese age-appropriate white gentleman apparent distress  HEENT: NC/HEENT EOMI PERRLA sclera conjunctiva are clear and and icteric mucous membranes are moist neck supple lymphadenopathy or mainly takes melena carotid bruit no JVD  Lungs: CTA bilaterally  Heart: Regular rhythm normal S1-S2 no murmur  abdomen: Soft nontender nondistended normoactive bowel sounds no masses no paraspinal nightly  Extremities: No edema. Pulses 23 plus bilaterally ×4  Neuro: Awake alert appropriate moves all 4 tremors.   Cranial nerves II through XII grossly intact  Integument no rash or lesion      Data:   Labs:  CBC with Differential:  Lab Results   Component Value Date    WBC 6.9 05/05/2018    RBC 4.40 05/05/2018    HGB 13.2 05/05/2018    HCT 39.3 05/05/2018     05/05/2018    MCV 89.3 05/05/2018    MCH 30.0 05/05/2018    MCHC 33.6 05/05/2018    RDW 13.4 05/05/2018    LYMPHOPCT 8.2 05/05/2018    MONOPCT 11.4 05/05/2018    BASOPCT 0.9 05/05/2018    MONOSABS 0.78 05/05/2018    LYMPHSABS 0.56 05/05/2018    EOSABS 0.31 05/05/2018    BASOSABS 0.06 05/05/2018     CMP:  Lab Results   Component Value Date     05/05/2018    K 3.5 05/05/2018    K 4.2 05/04/2018    CL 94 05/05/2018    CO2 23 05/05/2018    BUN 28 05/05/2018    CREATININE 1.6 05/05/2018    GFRAA 51

## 2018-05-05 NOTE — PROGRESS NOTES
Pulmonary Progress Note  5/5/2018 6:26 PM  Subjective:   Admit Date: 5/4/2018  PCP: Brandy Limon MD  Interval History:Denies any shortness of breath,    Diet: DIET GENERAL; Carb Control: 4 carb choices (60 gms)/meal; Low Sodium (2 GM)  SOB is: None  Cough: None  Wheezing: None  chest pain: None    Data:   Scheduled Meds:    metoprolol succinate  50 mg Oral BID    [START ON 5/6/2018] levothyroxine  25 mcg Oral Daily    [START ON 5/6/2018] valsartan  320 mg Oral Daily    metFORMIN  1,000 mg Oral BID WC    gabapentin  300 mg Oral Nightly    [START ON 5/6/2018] gabapentin  600 mg Oral QAM AC    pantoprazole  40 mg Oral QAM AC    allopurinol  300 mg Oral Daily    aspirin  325 mg Oral Daily    pravastatin  80 mg Oral Nightly    sodium chloride flush  10 mL Intravenous 2 times per day    heparin (porcine)  5,000 Units Subcutaneous Q12H       Continuous Infusions:    dextrose         PRN Meds: perflutren lipid microspheres, sodium chloride flush, acetaminophen, magnesium hydroxide, ondansetron, nitroGLYCERIN, glucose, dextrose, glucagon (rDNA), dextrose    No intake/output data recorded. No intake/output data recorded. No intake or output data in the 24 hours ending 05/05/18 1826    Patient Vitals for the past 96 hrs (Last 3 readings):   Weight   05/05/18 0600 249 lb (112.9 kg)   05/04/18 1545 259 lb (117.5 kg)   05/04/18 1029 251 lb (113.9 kg)         Recent Labs      05/04/18   1054  05/05/18   0710   WBC  6.5  6.9   HGB  13.5  13.2   HCT  41.2  39.3   MCV  91.8  89.3   PLT  190  215     Recent Labs      05/04/18   1054  05/05/18   0710   NA  138  135   K  4.2  3.5   CL  97*  94*   CO2  26  23   BUN  27*  28*   CREATININE  1.6*  1.6*     No results for input(s): PROT, ALB, ALKPHOS, ALT, AST, BILITOT, AMYLASE, LIPASE in the last 72 hours.   Recent Labs      05/05/18   0710   INR  1.2   APTT  31.3     Recent Labs      05/04/18   1054  05/04/18   1700  05/04/18   2340   CKTOTAL   --   91  93   CKMB   --

## 2018-05-05 NOTE — PROGRESS NOTES
Notified Dr. Manish Dyson that patient will not be able to receive VQ scan due to stress today and must wait 48 hours. Unable to change to CTA chest at this time due to creatinine of 1.6. Will monitor.

## 2018-05-05 NOTE — PROGRESS NOTES
APN        lisinopril (PRINIVIL;ZESTRIL) tablet 40 mg  40 mg Oral Daily Chilo Winter, DO        pantoprazole (PROTONIX) tablet 40 mg  40 mg Oral QAM AC Chilo Winter, DO        allopurinol (ZYLOPRIM) tablet 300 mg  300 mg Oral Daily Chilo Winter, DO        aspirin EC tablet 325 mg  325 mg Oral Daily Chilo Winter, DO        pravastatin (PRAVACHOL) tablet 80 mg  80 mg Oral Nightly Chilo Winter, DO   80 mg at 05/04/18 2125    gabapentin (NEURONTIN) capsule 300 mg  300 mg Oral TID Chilo Winter, DO   300 mg at 05/04/18 2124    metFORMIN (GLUCOPHAGE) tablet 500 mg  500 mg Oral BID WC Chilo Winter, DO   500 mg at 05/04/18 1843    sodium chloride flush 0.9 % injection 10 mL  10 mL Intravenous 2 times per day Chilo Winter, DO   10 mL at 05/04/18 2125    sodium chloride flush 0.9 % injection 10 mL  10 mL Intravenous PRN Chilo Winter, DO        acetaminophen (TYLENOL) tablet 650 mg  650 mg Oral Q4H PRN Chilo Winter, DO        magnesium hydroxide (MILK OF MAGNESIA) 400 MG/5ML suspension 30 mL  30 mL Oral Daily PRN Chilo Winter, DO        ondansetron [facility]) injection 4 mg  4 mg Intravenous Q6H PRN Chilo Winter, DO        nitroGLYCERIN (NITROSTAT) SL tablet 0.4 mg  0.4 mg Sublingual Q5 Min PRN Chilo Winter, DO        heparin (porcine) injection 5,000 Units  5,000 Units Subcutaneous Q12H Chilo Winter, DO   5,000 Units at 05/05/18 0520    glucose (GLUTOSE) 40 % oral gel 15 g  15 g Oral PRN Chilo Winter, DO        dextrose 50 % solution 12.5 g  12.5 g Intravenous PRN Chilo Winter, DO        glucagon (rDNA) injection 1 mg  1 mg Intramuscular PRN Chilo Winter, DO        dextrose 5 % solution  100 mL/hr Intravenous PRN Chilo Winter, DO        atenolol (TENORMIN) tablet 50 mg  50 mg Oral BID Chilo Winter, DO   50 mg at 05/04/18 2201    And    chlorthalidone (HYGROTON) tablet 25 mg  25 mg Oral BID Chilo Winter DO   25 mg at 05/04/18 2201      dextrose         Physical development. At the time of evaluation, his present condition including the above have been extensively discussed with he and family with the duration of discussion and counseling exceeding 35 minutes        Luis Garrett [facility], [address] [facility]

## 2018-05-05 NOTE — PROGRESS NOTES
Perfusion imaging reviewed and demonstrates evidence of a fixed basilar inferior and inferolateral perfusion abnormality potentially compatible with that of his known coronary anatomy with reported mild ventricular dilatation and mild left ventricular systolic dysfunction with an estimated post stress ejection fraction of 40-45% by radiology interpretation. His echocardiogram demonstrates evidence of a mildly dilated left ventricular chamber with moderate concentric left ventricular hypertrophy and regional wall motion of history basilar inferior and inferoseptal segments again consistent with his known coronary anatomy with left ventricular systolic function the lower limits of normal and estimated ejection fraction of 55% with stage I diastolic dysfunction and borderline left atrial enlargement without significant valvular pathology. These combined findings suggest him to be at low risk of acute ischemic events with diastolic dysfunction likely contributing to his symptoms in the face of suboptimal blood pressure control. On this basis in view of his renal dysfunction, his atenolol has been converted to metoprolol succinate which will additionally benefit systolic cardiac function with needs of appropriate lifestyle modification to achieve weight reduction and aggressive blood pressure control to optimize cardiac function. Evaluation of his renal function regarding its chronicity will need be correlated with his primary care physician. Continued aggressive risk factor modification of blood pressure and serum lipids will be necessary to reduce risk of future atherosclerotic development. He presently appears stable from a cardiovascular standpoint to undergo discharge once appropriate medically stabilized with needs of appropriate monitoring with his primary cardiologist, Jana Kaur on an outpatient basis following discharge.

## 2018-05-05 NOTE — H&P
CARDIAC CATH LAB PROCEDURE  12/27/2005    TAXUS Stent    DOPPLER ECHOCARDIOGRAPHY  4/6/02    LV dilated 6.6 cm, Mild RV enlargement, EF 45-50%    DOPPLER ECHOCARDIOGRAPHY  9/13/04    EF 55%, Concentric LVH, Left Atrial enlargement, Aortic Sclerosis. Mild MR        Medications Prior to Admission:    Prescriptions Prior to Admission: gabapentin (NEURONTIN) 300 MG capsule, Take 300 mg by mouth nightly. .  gabapentin (NEURONTIN) 300 MG capsule, Take 600 mg by mouth every morning (before breakfast). .  pravastatin (PRAVACHOL) 80 MG tablet, 80 mg daily. lisinopril (PRINIVIL;ZESTRIL) 40 MG tablet, Take 40 mg by mouth daily. atenolol-chlorthalidone (TENORETIC) 50-25 MG per tablet, Take 1.5 tablets by mouth 2 times daily   lansoprazole (PREVACID) 15 MG capsule, Take 15 mg by mouth daily. metformin (GLUCOPHAGE) 500 MG tablet, Take 1,000 mg by mouth 2 times daily (with meals)   allopurinol (ZYLOPRIM) 300 MG tablet, Take 300 mg by mouth daily. aspirin 325 MG EC tablet, Take 325 mg by mouth daily. Allergies:    Lipitor [atorvastatin calcium]    Social History:    reports that he quit smoking about 41 years ago. He started smoking about 56 years ago. He smoked 1.50 packs per day. He has never used smokeless tobacco. He reports that he does not drink alcohol or use drugs. Family History:   family history includes Cancer in his paternal grandfather.     REVIEW OF SYSTEMS    Constitutional: negative for chills and fevers  Eyes: negative for icterus and redness  Ears, nose, mouth, throat, and face: negative for epistaxis, hearing loss, nasal congestion, sore mouth, sore throat and tinnitus  Respiratory: negative for cough and hemoptysis  Cardiovascular: negative for irregular heart beat, lower extremity edema and palpitations  Gastrointestinal: negative for abdominal pain and vomiting  Genitourinary:negative for dysuria and frequency  Integument/breast: negative for pruritus and rash  Hematologic/lymphatic: negative Atherosclerotic PVD with intermittent claudication (HCC)    Chest pain    SOB (shortness of breath)    Elevated troponin    YAMILETH (acute kidney injury) ([facility] [address]    CAD in native artery    Mixed hyperlipidemia    Obesity (BMI 35.0-39.9 without comorbidity)    Ex-smoker    Unsteady gait    Type 2 diabetes mellitus with complication, without long-term current use of insulin ([facility] [address]         ASSESSMENT:      1. Intermittent nocturnal dyspnea possibly related to uncontrolled hypertension    2. Renal insufficiency, acute versus chronic    3. Left ventricular hypertrophy    4. Moderate reduction in ejection fraction suggestive of hypertensive heart disease    5. Diabetes mellitus type II, controlled    6. Hypothyroidism    PLAN:     1. Ventilation perfusion lung scan pending, appreciate pulmonary consult    2. Cardiology input appreciated, have discontinued Hygroton and will recheck renal function tomorrow    3. Check urinalysis for proteinuria    4. Consult nephrology regarding acute versus chronic renal insufficiency and poorly controlled hypertension    5. Check glycosylated hemoglobin    6.  Start low-dose thyroid supplementation    Ric Sebastian D.O.  2:25 PM  5/5/2018

## 2018-05-06 ENCOUNTER — APPOINTMENT (OUTPATIENT)
Dept: ULTRASOUND IMAGING | Age: 73
DRG: 291 | End: 2018-05-06
Payer: MEDICARE

## 2018-05-06 LAB
ANION GAP SERPL CALCULATED.3IONS-SCNC: 16 MMOL/L (ref 7–16)
ANION GAP SERPL CALCULATED.3IONS-SCNC: 16 MMOL/L (ref 7–16)
BUN BLDV-MCNC: 33 MG/DL (ref 8–23)
BUN BLDV-MCNC: 35 MG/DL (ref 8–23)
CALCIUM SERPL-MCNC: 8.8 MG/DL (ref 8.6–10.2)
CALCIUM SERPL-MCNC: 9.1 MG/DL (ref 8.6–10.2)
CHLORIDE BLD-SCNC: 94 MMOL/L (ref 98–107)
CHLORIDE BLD-SCNC: 94 MMOL/L (ref 98–107)
CHLORIDE URINE RANDOM: 55 MMOL/L
CO2: 24 MMOL/L (ref 22–29)
CO2: 25 MMOL/L (ref 22–29)
CREAT SERPL-MCNC: 2 MG/DL (ref 0.7–1.2)
CREAT SERPL-MCNC: 2.1 MG/DL (ref 0.7–1.2)
CREATININE URINE: 164 MG/DL (ref 40–278)
GFR AFRICAN AMERICAN: 38
GFR AFRICAN AMERICAN: 40
GFR NON-AFRICAN AMERICAN: 31 ML/MIN/1.73
GFR NON-AFRICAN AMERICAN: 33 ML/MIN/1.73
GLUCOSE BLD-MCNC: 111 MG/DL (ref 74–109)
GLUCOSE BLD-MCNC: 117 MG/DL (ref 74–109)
MAGNESIUM: 1.6 MG/DL (ref 1.6–2.6)
METER GLUCOSE: 112 MG/DL (ref 70–110)
PHOSPHORUS: 3 MG/DL (ref 2.5–4.5)
POTASSIUM SERPL-SCNC: 3.4 MMOL/L (ref 3.5–5)
POTASSIUM SERPL-SCNC: 3.4 MMOL/L (ref 3.5–5)
SODIUM BLD-SCNC: 134 MMOL/L (ref 132–146)
SODIUM BLD-SCNC: 135 MMOL/L (ref 132–146)
SODIUM URINE: 55 MMOL/L
URIC ACID, SERUM: 5.6 MG/DL (ref 3.4–7)

## 2018-05-06 PROCEDURE — 6370000000 HC RX 637 (ALT 250 FOR IP): Performed by: INTERNAL MEDICINE

## 2018-05-06 PROCEDURE — 80048 BASIC METABOLIC PNL TOTAL CA: CPT

## 2018-05-06 PROCEDURE — 2580000003 HC RX 258: Performed by: INTERNAL MEDICINE

## 2018-05-06 PROCEDURE — 99232 SBSQ HOSP IP/OBS MODERATE 35: CPT | Performed by: INTERNAL MEDICINE

## 2018-05-06 PROCEDURE — 84550 ASSAY OF BLOOD/URIC ACID: CPT

## 2018-05-06 PROCEDURE — 84300 ASSAY OF URINE SODIUM: CPT

## 2018-05-06 PROCEDURE — 2060000000 HC ICU INTERMEDIATE R&B

## 2018-05-06 PROCEDURE — 76770 US EXAM ABDO BACK WALL COMP: CPT

## 2018-05-06 PROCEDURE — 82962 GLUCOSE BLOOD TEST: CPT

## 2018-05-06 PROCEDURE — 36415 COLL VENOUS BLD VENIPUNCTURE: CPT

## 2018-05-06 PROCEDURE — 6360000002 HC RX W HCPCS: Performed by: INTERNAL MEDICINE

## 2018-05-06 PROCEDURE — 82570 ASSAY OF URINE CREATININE: CPT

## 2018-05-06 PROCEDURE — 83735 ASSAY OF MAGNESIUM: CPT

## 2018-05-06 PROCEDURE — 82436 ASSAY OF URINE CHLORIDE: CPT

## 2018-05-06 PROCEDURE — 93975 VASCULAR STUDY: CPT

## 2018-05-06 PROCEDURE — 84100 ASSAY OF PHOSPHORUS: CPT

## 2018-05-06 RX ORDER — POTASSIUM CHLORIDE 20 MEQ/1
20 TABLET, EXTENDED RELEASE ORAL ONCE
Status: COMPLETED | OUTPATIENT
Start: 2018-05-06 | End: 2018-05-06

## 2018-05-06 RX ORDER — VALSARTAN 80 MG/1
80 TABLET ORAL DAILY
Status: DISCONTINUED | OUTPATIENT
Start: 2018-05-06 | End: 2018-05-07 | Stop reason: HOSPADM

## 2018-05-06 RX ORDER — VALSARTAN 80 MG/1
80 TABLET ORAL DAILY
Status: DISCONTINUED | OUTPATIENT
Start: 2018-05-07 | End: 2018-05-06

## 2018-05-06 RX ADMIN — ASPIRIN 325 MG: 325 TABLET, DELAYED RELEASE ORAL at 08:30

## 2018-05-06 RX ADMIN — ALLOPURINOL 300 MG: 300 TABLET ORAL at 08:30

## 2018-05-06 RX ADMIN — Medication 10 ML: at 20:22

## 2018-05-06 RX ADMIN — PRAVASTATIN SODIUM 80 MG: 20 TABLET ORAL at 21:41

## 2018-05-06 RX ADMIN — POTASSIUM CHLORIDE 20 MEQ: 20 TABLET, EXTENDED RELEASE ORAL at 19:04

## 2018-05-06 RX ADMIN — HEPARIN SODIUM 5000 UNITS: 10000 INJECTION, SOLUTION INTRAVENOUS; SUBCUTANEOUS at 05:11

## 2018-05-06 RX ADMIN — METFORMIN HYDROCHLORIDE 1000 MG: 1000 TABLET ORAL at 18:10

## 2018-05-06 RX ADMIN — PANTOPRAZOLE SODIUM 40 MG: 40 TABLET, DELAYED RELEASE ORAL at 07:03

## 2018-05-06 RX ADMIN — GABAPENTIN 600 MG: 300 CAPSULE ORAL at 07:04

## 2018-05-06 RX ADMIN — METOPROLOL SUCCINATE 75 MG: 50 TABLET, EXTENDED RELEASE ORAL at 08:30

## 2018-05-06 RX ADMIN — Medication 10 ML: at 17:46

## 2018-05-06 RX ADMIN — VALSARTAN 80 MG: 80 TABLET ORAL at 21:42

## 2018-05-06 RX ADMIN — METFORMIN HYDROCHLORIDE 1000 MG: 1000 TABLET ORAL at 08:30

## 2018-05-06 RX ADMIN — GABAPENTIN 300 MG: 300 CAPSULE ORAL at 20:30

## 2018-05-06 RX ADMIN — LEVOTHYROXINE SODIUM 25 MCG: 25 TABLET ORAL at 07:04

## 2018-05-06 RX ADMIN — METOPROLOL SUCCINATE 75 MG: 50 TABLET, EXTENDED RELEASE ORAL at 20:20

## 2018-05-06 RX ADMIN — Medication 10 ML: at 08:31

## 2018-05-06 RX ADMIN — HEPARIN SODIUM 5000 UNITS: 10000 INJECTION, SOLUTION INTRAVENOUS; SUBCUTANEOUS at 16:24

## 2018-05-06 RX ADMIN — POTASSIUM CHLORIDE 20 MEQ: 20 TABLET, EXTENDED RELEASE ORAL at 11:36

## 2018-05-06 ASSESSMENT — PAIN SCALES - GENERAL
PAINLEVEL_OUTOF10: 0
PAINLEVEL_OUTOF10: 0

## 2018-05-06 NOTE — PROGRESS NOTES
[facility] MD Haylie Antony MD Friddie Pilon, MD Magdalena Awkward, MD  Progress Note    5/6/2018    SUBJECTIVE:   5/6: No new complaint. (-) c/o's  (-) sob/valencia/cp/palp Appetite ok  R was unremarkable. BP this morning 113/59, repeat 121/60. PROBLEM LIST:    Active Problems:    Chest pain    SOB (shortness of breath)    Elevated troponin    YAMILETH (acute kidney injury) ([address]    CAD in native artery    Mixed hyperlipidemia    Obesity (BMI 35.0-39.9 without comorbidity)    Ex-smoker    Unsteady gait    Type 2 diabetes mellitus with complication, without long-term current use of insulin (HCC)  Resolved Problems:    * No resolved hospital problems.  *         DIET:    Diet NPO, After Midnight Exceptions are: Sips with Meds     MEDS (scheduled):    metoprolol succinate  75 mg Oral BID    levothyroxine  25 mcg Oral Daily    valsartan  320 mg Oral Daily    metFORMIN  1,000 mg Oral BID WC    gabapentin  300 mg Oral Nightly    gabapentin  600 mg Oral QAM AC    pantoprazole  40 mg Oral QAM AC    allopurinol  300 mg Oral Daily    aspirin  325 mg Oral Daily    pravastatin  80 mg Oral Nightly    sodium chloride flush  10 mL Intravenous 2 times per day    heparin (porcine)  5,000 Units Subcutaneous Q12H       MEDS (infusions):   dextrose         MEDS (prn):  perflutren lipid microspheres, sodium chloride flush, acetaminophen, magnesium hydroxide, ondansetron, nitroGLYCERIN, glucose, dextrose, glucagon (rDNA), dextrose    PHYSICAL EXAM:     Patient Vitals for the past 24 hrs:   BP Temp Temp src Pulse Resp SpO2 Weight   05/06/18 0930 121/60 - - - - - -   05/06/18 0815 (!) 113/59 99.2 °F (37.3 °C) Oral 73 18 95 % -   05/06/18 0132 - - - - - - 250 lb 14.4 oz (113.8 kg)   05/05/18 2129 (!) 179/81 98.9 °F (37.2 °C) Oral 84 16 98 % -   05/05/18 1645 (!) 156/68 99 °F (37.2 °C) Oral 87 16 99 % -   05/05/18 1230 (!) 157/75 - - 67 - - -   @    No intake or output data in the 24 hours hypertension with which she presented, itself. 4. Chronic kidney disease stage IIIB, secondary to hypertensive nephrosclerosis. Has 2.3 g per day of assessment of proteinuria. Malignant hypertension can cause proteinuria, though typically essential hypertension does not. Bears repeating with a 24-hour urine study in the next 1-2 weeks. No signs of nephrotic syndrome. 5. LVH, concentric, moderate, and mildly dilated LV, secondary to long-standing hypertension  6. Hypokalemia. Renin-mediated secondary hypertension (primary Sis or AIXA in particular) AV the culprit, though very elevated BP can also cause low potassium, as can many other conditions . 7. Hypotension, relative. No signs of sepsis. Suspect simply due to changes in antihypertensive medication regimen     Atenolol changed to metoprolol. Otherwise no other medication changes made, and we have seen a dramatic improvement in his hypertension. This of course raises the question of whether he is taking his medications at home as prescribed, as I doubt that this change and a following a low-sodium diet would make this much of a difference. I addressed this with . Alexandra Adames and his wife. His wife insists he takes his medicine as prescribed, though he does not really agree with her. Lisinopril stopped yesterday as a suspect the cough that he has had for the last 2 years may be ACE-I - induced. ARB (valsartan) was started in its place, though he has not yet received a dose    RECOMMENDATIONS:  1. Hold valsartan for now   2. will restart at lower dose and with parameters for holding (note:  His lvh is likely to benefit from RAAS blockade)   3. Cont metoprolol at current dose  4. r/o secondary HTN -- plasma renin/sis not useful as he is on a ACE-I. Check renal U/S & duplex U/S bilat renal aa  5. Further titration of anti-HTN regimen over next several days  6. Supplement potassium, conservatively  7.  Repeat BMP later today      Electronically signed

## 2018-05-06 NOTE — PROGRESS NOTES
allopurinol (ZYLOPRIM) tablet 300 mg  300 mg Oral Daily Bunny Kelp, DO   300 mg at 05/06/18 0830    aspirin EC tablet 325 mg  325 mg Oral Daily Bunny Kelp, DO   325 mg at 05/06/18 0830    pravastatin (PRAVACHOL) tablet 80 mg  80 mg Oral Nightly Bunny Kelp, DO   80 mg at 05/05/18 2143    sodium chloride flush 0.9 % injection 10 mL  10 mL Intravenous 2 times per day Bunny Kelp, DO   10 mL at 05/06/18 0831    sodium chloride flush 0.9 % injection 10 mL  10 mL Intravenous PRN Bunny Kelp, DO        acetaminophen (TYLENOL) tablet 650 mg  650 mg Oral Q4H PRN Bunny Kelp, DO        magnesium hydroxide (MILK OF MAGNESIA) 400 MG/5ML suspension 30 mL  30 mL Oral Daily PRN Bunny Kelp, DO        ondansetron [facility]) injection 4 mg  4 mg Intravenous Q6H PRN Bunny Kelp, DO        nitroGLYCERIN (NITROSTAT) SL tablet 0.4 mg  0.4 mg Sublingual Q5 Min PRN Bunny Kelp, DO        heparin (porcine) injection 5,000 Units  5,000 Units Subcutaneous Q12H Bunny Kelp, DO   5,000 Units at 05/06/18 0511    glucose (GLUTOSE) 40 % oral gel 15 g  15 g Oral PRN Bunny Kelp, DO        dextrose 50 % solution 12.5 g  12.5 g Intravenous PRN Bunny Kelp, DO        glucagon (rDNA) injection 1 mg  1 mg Intramuscular PRN Bunny Kelp, DO        dextrose 5 % solution  100 mL/hr Intravenous PRN Bunny Kelp, DO           Problem list:    Patient Active Problem List   Diagnosis    Hypertension    CAD (coronary artery disease)    Ventricular ectopy    PVCs (premature ventricular contractions)    Atherosclerotic PVD with intermittent claudication (HCC)    Chest pain    SOB (shortness of breath)    Elevated troponin    YAMILETH (acute kidney injury) ([facility] [address]    CAD in native artery    Mixed hyperlipidemia    Obesity (BMI 35.0-39.9 without comorbidity)    Ex-smoker    Unsteady gait    Type 2 diabetes mellitus with complication, without long-term current use of insulin (HCC) Assessment:       1. Intermittent nocturnal dyspnea possibly related to uncontrolled hypertension     2. Renal insufficiency, acute versus chronic     3. Left ventricular hypertrophy     4. Moderate reduction in ejection fraction suggestive of hypertensive heart disease     5. Diabetes mellitus type II, controlled     6. Hypothyroidism    Plan:    1. Stop valsartan    2. Continue metoprolol    3. Renal ultrasound/duplex pending    4.  Discussed with cardiology      Michelle Aguilar D.O., FACOI  1:35 PM  5/6/2018

## 2018-05-06 NOTE — PROGRESS NOTES
[Atorvastatin Calcium]        Current Medications:  Current Facility-Administered Medications   Medication Dose Route Frequency Provider Last Rate Last Dose    metoprolol succinate (TOPROL XL) extended release tablet 50 mg  50 mg Oral BID Maggy Shea MD   50 mg at 05/05/18 2146    levothyroxine (SYNTHROID) tablet 25 mcg  25 mcg Oral Daily Madison Goldonna, DO        valsartan (DIOVAN) tablet 320 mg  320 mg Oral Daily Prince John MD        metFORMIN (GLUCOPHAGE) tablet 1,000 mg  1,000 mg Oral BID WC Madison Goldonna, DO   1,000 mg at 05/05/18 1726    gabapentin (NEURONTIN) capsule 300 mg  300 mg Oral Nightly Madison Jane, DO   300 mg at 05/05/18 2143    gabapentin (NEURONTIN) capsule 600 mg  600 mg Oral QAM AC Madison Goldonna, DO        perflutren lipid microspheres (DEFINITY) injection 1.65 mg  1.5 mL Intravenous ONCE PRN Marcio Quince.  BUNNY Rosario        pantoprazole (PROTONIX) tablet 40 mg  40 mg Oral QAM AC Madison Goldonna, DO   40 mg at 05/05/18 0357    allopurinol (ZYLOPRIM) tablet 300 mg  300 mg Oral Daily Madison Jane, DO   300 mg at 05/05/18 1234    aspirin EC tablet 325 mg  325 mg Oral Daily Madison Jane, DO   325 mg at 05/05/18 1234    pravastatin (PRAVACHOL) tablet 80 mg  80 mg Oral Nightly Madison Jane, DO   80 mg at 05/05/18 2143    sodium chloride flush 0.9 % injection 10 mL  10 mL Intravenous 2 times per day Madison Jane, DO   10 mL at 05/05/18 2131    sodium chloride flush 0.9 % injection 10 mL  10 mL Intravenous PRN Madison Goldonna, DO        acetaminophen (TYLENOL) tablet 650 mg  650 mg Oral Q4H PRN Madison Jane, DO        magnesium hydroxide (MILK OF MAGNESIA) 400 MG/5ML suspension 30 mL  30 mL Oral Daily PRN Madison Jane, DO        ondansetron [facility]) injection 4 mg  4 mg Intravenous Q6H PRN Madison Goldonna, DO        nitroGLYCERIN (NITROSTAT) SL tablet 0.4 mg  0.4 mg Sublingual Q5 Min PRN Madison Jane,         heparin (porcine) injection 5,000 Units  5,000 Units found for: BNP  Lab Results   Component Value Date    WBC 6.9 05/05/2018    RBC 4.40 05/05/2018    HGB 13.2 05/05/2018    HCT 39.3 05/05/2018    MCV 89.3 05/05/2018    MCH 30.0 05/05/2018    MCHC 33.6 05/05/2018    RDW 13.4 05/05/2018     05/05/2018    MPV 10.3 05/05/2018     No results for input(s): ALKPHOS, ALT, AST, PROT, BILITOT, BILIDIR, LABALBU in the last 72 hours.   Lab Results   Component Value Date    MG 1.6 05/06/2018     Lab Results   Component Value Date    PROTIME 13.2 05/05/2018    INR 1.2 05/05/2018     Lab Results   Component Value Date    TSH 5.550 05/05/2018     No components found for: CHLPL  Lab Results   Component Value Date    TRIG 185 (H) 05/05/2018     Lab Results   Component Value Date    HDL 27 05/05/2018     Lab Results   Component Value Date    LDLCALC 61 05/05/2018       Cardiac Tests:  Telemetry findings reviewed: Sinus rhythm with occasional ventricular ectopy, no new tachy/bradyarrhythmias overnight  Last Echocardiogram: An echocardiogram demonstrates evidence of a mildly dilated left ventricular chamber with moderate concentric left ventricular hypertrophy and regional wall motion abnormalities of the basilar inferior and inferolateral segments with normal left ventricular systolic function and stage I diastolic dysfunction with borderline left atrial enlargement and no significant valvular pathology  Last stress test:  A gated vasodilator myocardial perfusion imaging study demonstrated evidence of a fixed basilar inferior and inferolateral perfusion abnormality with a reportedly dilated left ventricular chamber and left ventricular systolic function the lower limits of normal to mildly impaired and a post stress ejection fraction of 40-45%      ASSESSMENT / PLAN: On a clinical basis, the patient appears stable from a cardiovascular standpoint with his perfusion imaging study suggesting a low risk of ischemic events in the most likely source of his symptomatology potentially benefit diastolic dysfunction in part related to suboptimal blood pressure control. His beta blocker has been converted from that of atenolol to metoprolol succinate with further modification of his medical regimen deferred to the nephrology service, especially in light of his present evidence of acute kidney injury. Continued appropriate lifestyle modification inclusive of weight reduction will benefit diastolic cardiac performance as well as assist in blood pressure regulation with needs of continued aggressive risk factor modification of blood pressure and serum lipids essential to reducing risk of future atherosclerotic development. We will further evaluate him during his present hospitalization should additional cardiovascular difficulties or concerns arise with subsequent arrangements for follow up with his primary cardiologist, Freada Heimlich arranged in one month        Luis Ohara, [address] [facility]

## 2018-05-07 VITALS
RESPIRATION RATE: 16 BRPM | HEART RATE: 78 BPM | SYSTOLIC BLOOD PRESSURE: 122 MMHG | OXYGEN SATURATION: 96 % | WEIGHT: 253.2 LBS | TEMPERATURE: 99 F | HEIGHT: 70 IN | BODY MASS INDEX: 36.25 KG/M2 | DIASTOLIC BLOOD PRESSURE: 58 MMHG

## 2018-05-07 PROBLEM — I10 ACCELERATED HYPERTENSION: Status: ACTIVE | Noted: 2018-05-07

## 2018-05-07 LAB
ANION GAP SERPL CALCULATED.3IONS-SCNC: 15 MMOL/L (ref 7–16)
BUN BLDV-MCNC: 35 MG/DL (ref 8–23)
CALCIUM SERPL-MCNC: 8.9 MG/DL (ref 8.6–10.2)
CHLORIDE BLD-SCNC: 98 MMOL/L (ref 98–107)
CO2: 23 MMOL/L (ref 22–29)
CREAT SERPL-MCNC: 2.1 MG/DL (ref 0.7–1.2)
GFR AFRICAN AMERICAN: 38
GFR NON-AFRICAN AMERICAN: 31 ML/MIN/1.73
GLUCOSE BLD-MCNC: 159 MG/DL (ref 74–109)
MAGNESIUM: 1.6 MG/DL (ref 1.6–2.6)
PHOSPHORUS: 2.7 MG/DL (ref 2.5–4.5)
POTASSIUM SERPL-SCNC: 3.6 MMOL/L (ref 3.5–5)
SODIUM BLD-SCNC: 136 MMOL/L (ref 132–146)

## 2018-05-07 PROCEDURE — 83735 ASSAY OF MAGNESIUM: CPT

## 2018-05-07 PROCEDURE — 6370000000 HC RX 637 (ALT 250 FOR IP): Performed by: INTERNAL MEDICINE

## 2018-05-07 PROCEDURE — 84100 ASSAY OF PHOSPHORUS: CPT

## 2018-05-07 PROCEDURE — 36415 COLL VENOUS BLD VENIPUNCTURE: CPT

## 2018-05-07 PROCEDURE — 6360000002 HC RX W HCPCS: Performed by: INTERNAL MEDICINE

## 2018-05-07 PROCEDURE — 2580000003 HC RX 258: Performed by: INTERNAL MEDICINE

## 2018-05-07 PROCEDURE — 80048 BASIC METABOLIC PNL TOTAL CA: CPT

## 2018-05-07 RX ORDER — LEVOTHYROXINE SODIUM 0.03 MG/1
25 TABLET ORAL DAILY
Qty: 30 TABLET | Refills: 3 | Status: SHIPPED | OUTPATIENT
Start: 2018-05-08

## 2018-05-07 RX ORDER — METOPROLOL SUCCINATE 25 MG/1
75 TABLET, EXTENDED RELEASE ORAL 2 TIMES DAILY
Qty: 30 TABLET | Refills: 3 | Status: SHIPPED | OUTPATIENT
Start: 2018-05-07

## 2018-05-07 RX ORDER — VALSARTAN 80 MG/1
80 TABLET ORAL DAILY
Qty: 30 TABLET | Refills: 3 | Status: SHIPPED
Start: 2018-05-08 | End: 2021-07-30

## 2018-05-07 RX ADMIN — VALSARTAN 80 MG: 80 TABLET ORAL at 09:20

## 2018-05-07 RX ADMIN — METOPROLOL SUCCINATE 75 MG: 50 TABLET, EXTENDED RELEASE ORAL at 09:21

## 2018-05-07 RX ADMIN — Medication 10 ML: at 09:21

## 2018-05-07 RX ADMIN — GABAPENTIN 600 MG: 300 CAPSULE ORAL at 05:59

## 2018-05-07 RX ADMIN — METFORMIN HYDROCHLORIDE 1000 MG: 1000 TABLET ORAL at 09:20

## 2018-05-07 RX ADMIN — ASPIRIN 325 MG: 325 TABLET, DELAYED RELEASE ORAL at 09:21

## 2018-05-07 RX ADMIN — LEVOTHYROXINE SODIUM 25 MCG: 25 TABLET ORAL at 06:54

## 2018-05-07 RX ADMIN — HEPARIN SODIUM 5000 UNITS: 10000 INJECTION, SOLUTION INTRAVENOUS; SUBCUTANEOUS at 06:00

## 2018-05-07 RX ADMIN — ALLOPURINOL 300 MG: 300 TABLET ORAL at 09:21

## 2018-05-07 RX ADMIN — PANTOPRAZOLE SODIUM 40 MG: 40 TABLET, DELAYED RELEASE ORAL at 05:59

## 2018-05-07 ASSESSMENT — PAIN SCALES - GENERAL: PAINLEVEL_OUTOF10: 0

## 2018-05-07 NOTE — PROGRESS NOTES
injection 4 mg  4 mg Intravenous Q6H PRN Thornell Girt, DO        nitroGLYCERIN (NITROSTAT) SL tablet 0.4 mg  0.4 mg Sublingual Q5 Min PRN Thornell Girt, DO        heparin (porcine) injection 5,000 Units  5,000 Units Subcutaneous Q12H Thornell Girt, DO   5,000 Units at 05/07/18 0600    glucose (GLUTOSE) 40 % oral gel 15 g  15 g Oral PRN Thornell Girt, DO        dextrose 50 % solution 12.5 g  12.5 g Intravenous PRN Thornell Girt, DO        glucagon (rDNA) injection 1 mg  1 mg Intramuscular PRN Thornell Girt, DO        dextrose 5 % solution  100 mL/hr Intravenous PRN Thornell Girt, DO           Objective:   BP (!) 122/58   Pulse 78   Temp 99 °F (37.2 °C) (Oral)   Resp 16   Ht 5' 10\" (1.778 m)   Wt 253 lb 3.2 oz (114.9 kg)   SpO2 96%   BMI 36.33 kg/m²     Intake/Output Summary (Last 24 hours) at 05/07/18 1202  Last data filed at 05/06/18 2201   Gross per 24 hour   Intake              240 ml   Output              300 ml   Net              -60 ml       WDWN Male in no acute distress  Skin: warm, dry, without rash  HEENT: PERRL, face symmetric, normal moist oral mucosa. Chest: symmetric with equal air entry. Normal resonance to percussion. Lungs: without rales, rhonchi, or wheezes  Cardiac: Normal PMI, S1, S2 normal.    Abdomen: Rounded, soft, non-tender, active bowel sounds. Extremities: No clubbing, cyanosis, or edema  Neuro: CN II - XII grossly intact. Alert, moves all extremities symmetrically.  Oriented to person, time place  Psych:  Normal affect and insight    CBC with Differential:  Lab Results   Component Value Date    WBC 6.9 05/05/2018    RBC 4.40 05/05/2018    HGB 13.2 05/05/2018    HCT 39.3 05/05/2018     05/05/2018    MCV 89.3 05/05/2018    MCH 30.0 05/05/2018    MCHC 33.6 05/05/2018    RDW 13.4 05/05/2018    LYMPHOPCT 8.2 05/05/2018    MONOPCT 11.4 05/05/2018    BASOPCT 0.9 05/05/2018    MONOSABS 0.78 05/05/2018    LYMPHSABS 0.56 05/05/2018    EOSABS 0.31 05/05/2018

## 2018-05-07 NOTE — PLAN OF CARE
Problem: Falls - Risk of  Goal: Absence of falls  Outcome: Met This Shift      Problem: PAIN  Goal: Pain level will decrease  Pain level will decrease     Outcome: Met This Shift      Problem: Respiratory Function - Compromised:  Goal: Able to breathe comfortably  Outcome: Met This Shift      Problem: HH SN BLOOD GLUCOSE MONITORING  Goal: Blood glucose control  Maintain plasma glucose levels within expected range.      Outcome: Ongoing

## 2018-05-07 NOTE — PROGRESS NOTES
Dr. Selin Beasley with Nephrology came to desk, ok to discharge from renal point of view, requested BMP to be done on Thursday and fax results to the office, included in discharge instructions.   Electronically signed by Cely España RN on 5/7/2018 at 10:16 AM

## 2018-05-07 NOTE — DISCHARGE SUMMARY
Physician Discharge Summary     Patient ID:  Florentin Park  [id number]  68 y.o.  1945    Admit date: 2018    Discharge date and time: 2018 10:57 AM        Admission Diagnoses: Chest pain [R07.9]  Chest pain [R07.9]  Chest pain [R07.9]  Chest pain [R07.9]    Discharge Diagnoses:        1. Accelerated hypertension    2. Acute kidney injury superimposed on chronic kidney disease stage III     3. Left ventricular hypertrophy     4. Moderate reduction in ejection fraction suggestive of hypertensive heart disease     5. Diabetes mellitus type II, controlled     6. Hypothyroidism    Consults: cardiology, pulmonary/intensive care and nephrology    Procedures:   Narrative   Patient MRN:  [id number]   : 1945   Age: 67 years   Gender: Male   Order Date:  2018 3:30 PM   EXAM: US RETROPERITONEAL AIXA, US DUP ABD PEL RETRO SCROT COMPLETE   NUMBER OF IMAGES:  64   INDICATION:  r/o AIXA     COMPARISON: None       The right kidney measures 13 x 5.8 x 5.57    The left kidney  measures 12.5 x 6 x 5.37   There is a mass in the left kidney measuring 3.8 x 3.8 x 3.6 cm per   There is no hydronephrosis identified.       There is no calculus identified.       The bladder is unremarkable.               Velocity within the aorta measure 100 cm/sec. Velocities in the right renal artery measure 149 cm/sec,  renal aortic   ratio is 1.5. Velocities within the left renal artery measure 119 cm/sec, renal   aortic ratio on the left is 1. .           Impression   1.  No evidence for hemodynamically significant renal artery stenosis.          Laboratory:   Last 3 BMP  Recent Labs      18   0241  18   1740  18   0800   NA  134  135  136   K  3.4*  3.4*  3.6   CL  94*  94*  98   CO2  24  25  23   BUN  33*  35*  35*   CREATININE  2.0*  2.1*  2.1*   GLUCOSE  117*  111*  159*   CALCIUM  8.8  9.1  8.9       Last 3 CBC:  Recent Labs      18   0710   WBC  6.9   RBC  4.40   HGB  13.2   HCT  39.3   MCV  89.3

## 2018-05-07 NOTE — PROGRESS NOTES
Oral 79 18 94 % -   05/06/18 1615 (!) 146/70 97.8 °F (36.6 °C) Oral 69 18 95 % -   @      Intake/Output Summary (Last 24 hours) at 05/07/18 1057  Last data filed at 05/06/18 2201   Gross per 24 hour   Intake              240 ml   Output              300 ml   Net              -60 ml         Wt Readings from Last 3 Encounters:   05/07/18 253 lb 3.2 oz (114.9 kg)   04/26/16 269 lb 6.4 oz (122.2 kg)   07/31/15 267 lb 14.4 oz (121.5 kg)       Constitutional:  in no acute distress  Oral: mucus membranes moist  Neck: no JVD  Cardiovascular: S1, S2 regular rhythm, no murmur,or rub  Respiratory:  No crackles, no wheeze  Gastrointestinal:  Soft, nontender, nondistended, NABS  Ext: no edema, feet warm  Skin: dry, no rash  Neuro: awake, alert, interactive      DATA:    Recent Labs      05/05/18   0710   WBC  6.9   HGB  13.2   HCT  39.3   MCV  89.3   PLT  215     Recent Labs      05/06/18   0241  05/06/18   1740  05/07/18   0800   NA  134  135  136   K  3.4*  3.4*  3.6   CL  94*  94*  98   CO2  24  25  23   MG  1.6   --   1.6   PHOS  3.0   --   2.7   BUN  33*  35*  35*   CREATININE  2.0*  2.1*  2.1*       Lab Results   Component Value Date    LABPROT 2.3 (H) 05/05/2018    LABPROT 2.3 05/05/2018     2-D echo (5/5/18)  Summary   Mildly dilated left ventricle.   Moderate left ventricular concentric hypertrophy noted.   Regional wall motion abnormalities.   Normal left ventricular ejection fraction.   There is doppler evidence of stage I diastolic dysfunction.   The left atrium is borderline dilated.   The aortic valve appears mildly sclerotic.   Mild aortic regurgitation is noted. ASSESSMENT:    1. Accelerated HTN -- 203/85 in ED.  157/75 most recently. Likeliest etiology remiais essential HTN, though given its severity, worth r/oing secondary causes. Relative hypokalemia raises q of AIXA vs primary sis. 2. long-standing non-productive cough.   Has post-nasal drip, allergies, but story also suspicious for ace-I-induced, and wife wonders if it could be the lisinopril  3. Acute kidney injury, the bump in his creatinine from 1.6-2.0 mg/dL. Possibly secondary to the abrupt drop in his blood pressure, possibly secondary to the accelerated hypertension with which she presented, itself. 4. Chronic kidney disease stage IIIB, secondary to hypertensive nephrosclerosis. Has 2.3 g per day of assessment of proteinuria. Malignant hypertension can cause proteinuria, though typically essential hypertension does not. Bears repeating with a 24-hour urine study in the next 1-2 weeks. No signs of nephrotic syndrome. 5. LVH, concentric, moderate, and mildly dilated LV, secondary to long-standing hypertension  6. Hypokalemia. Renin-mediated secondary hypertension (primary Rahul or AIXA in particular) AV the culprit, though very elevated BP can also cause low potassium, as can many other conditions . 7. Hypotension, relative. No signs of sepsis. Suspect simply due to changes in antihypertensive medication regimen     Atenolol changed to metoprolol. Otherwise no other medication changes made, and we have seen a dramatic improvement in his hypertension. This of course raises the question of whether he is taking his medications at home as prescribed, as I doubt that this change and a following a low-sodium diet would make this much of a difference. I addressed this with . Remington Mcmahon and his wife. His wife insists he takes his medicine as prescribed, though he does not really agree with her. Lisinopril stopped yesterday as a suspect the cough that he has had for the last 2 years may be ACE-I - induced. ARB (valsartan) was started in its place, though he has not yet received a dose    RECOMMENDATIONS:  1. Renal US with no evidence of AIXA . No reported Hydro   2. Valsartan started this am .  parameters for holding (note:  His lvh is likely to benefit from RAAS blockade)   3. Cont metoprolol at current dose  4.  Further titration of anti-HTN regimen

## 2018-05-09 ENCOUNTER — TELEPHONE (OUTPATIENT)
Dept: CARDIOLOGY CLINIC | Age: 73
End: 2018-05-09

## 2018-05-21 ENCOUNTER — HOSPITAL ENCOUNTER (OUTPATIENT)
Age: 73
Discharge: HOME | End: 2018-05-23
Payer: MEDICARE

## 2018-05-21 LAB
ALBUMIN SERPL-MCNC: 4.2 G/DL (ref 3.5–5.2)
ALP BLD-CCNC: 74 U/L (ref 40–129)
ALT SERPL-CCNC: 13 U/L (ref 0–40)
ANION GAP SERPL CALCULATED.3IONS-SCNC: 18 MMOL/L (ref 7–16)
AST SERPL-CCNC: 15 U/L (ref 0–39)
BILIRUB SERPL-MCNC: 0.3 MG/DL (ref 0–1.2)
BUN BLDV-MCNC: 25 MG/DL (ref 8–23)
CALCIUM SERPL-MCNC: 10.1 MG/DL (ref 8.6–10.2)
CHLORIDE BLD-SCNC: 97 MMOL/L (ref 98–107)
CO2: 27 MMOL/L (ref 22–29)
CREAT SERPL-MCNC: 1.6 MG/DL (ref 0.7–1.2)
GFR AFRICAN AMERICAN: 51
GFR NON-AFRICAN AMERICAN: 42 ML/MIN/1.73
GLUCOSE BLD-MCNC: 103 MG/DL (ref 74–109)
POTASSIUM SERPL-SCNC: 4.5 MMOL/L (ref 3.5–5)
SODIUM BLD-SCNC: 142 MMOL/L (ref 132–146)
TOTAL PROTEIN: 7 G/DL (ref 6.4–8.3)

## 2018-05-21 PROCEDURE — 80053 COMPREHEN METABOLIC PANEL: CPT

## 2018-06-04 ENCOUNTER — HOSPITAL ENCOUNTER (OUTPATIENT)
Dept: CT IMAGING | Age: 73
Discharge: HOME | End: 2018-06-06
Payer: MEDICARE

## 2018-06-04 DIAGNOSIS — N13.8 PROSTATIC HYPERPLASIA, BENIGN LOCALIZED, WITH OBSTRUCTION: ICD-10-CM

## 2018-06-04 DIAGNOSIS — N43.40 SPERMATOCELE OF EPIDIDYMIS: ICD-10-CM

## 2018-06-04 DIAGNOSIS — N40.1 PROSTATIC HYPERPLASIA, BENIGN LOCALIZED, WITH OBSTRUCTION: ICD-10-CM

## 2018-06-04 PROCEDURE — 74176 CT ABD & PELVIS W/O CONTRAST: CPT

## 2018-06-04 PROCEDURE — 6360000004 HC RX CONTRAST MEDICATION: Performed by: RADIOLOGY

## 2018-06-04 RX ADMIN — IOHEXOL 50 ML: 240 INJECTION, SOLUTION INTRATHECAL; INTRAVASCULAR; INTRAVENOUS; ORAL at 14:15

## 2018-06-21 ENCOUNTER — OFFICE VISIT (OUTPATIENT)
Dept: CARDIOLOGY CLINIC | Age: 73
End: 2018-06-21
Payer: MEDICARE

## 2018-06-21 VITALS
DIASTOLIC BLOOD PRESSURE: 70 MMHG | HEART RATE: 55 BPM | SYSTOLIC BLOOD PRESSURE: 128 MMHG | RESPIRATION RATE: 14 BRPM | HEIGHT: 69 IN | WEIGHT: 241 LBS | BODY MASS INDEX: 35.7 KG/M2

## 2018-06-21 DIAGNOSIS — I25.10 CORONARY ARTERY DISEASE INVOLVING NATIVE CORONARY ARTERY OF NATIVE HEART WITHOUT ANGINA PECTORIS: Primary | ICD-10-CM

## 2018-06-21 PROCEDURE — G8417 CALC BMI ABV UP PARAM F/U: HCPCS | Performed by: INTERNAL MEDICINE

## 2018-06-21 PROCEDURE — G8598 ASA/ANTIPLAT THER USED: HCPCS | Performed by: INTERNAL MEDICINE

## 2018-06-21 PROCEDURE — 93000 ELECTROCARDIOGRAM COMPLETE: CPT | Performed by: INTERNAL MEDICINE

## 2018-06-21 PROCEDURE — 1123F ACP DISCUSS/DSCN MKR DOCD: CPT | Performed by: INTERNAL MEDICINE

## 2018-06-21 PROCEDURE — 4040F PNEUMOC VAC/ADMIN/RCVD: CPT | Performed by: INTERNAL MEDICINE

## 2018-06-21 PROCEDURE — G8427 DOCREV CUR MEDS BY ELIG CLIN: HCPCS | Performed by: INTERNAL MEDICINE

## 2018-06-21 PROCEDURE — 1036F TOBACCO NON-USER: CPT | Performed by: INTERNAL MEDICINE

## 2018-06-21 PROCEDURE — 99213 OFFICE O/P EST LOW 20 MIN: CPT | Performed by: INTERNAL MEDICINE

## 2018-06-21 PROCEDURE — 3017F COLORECTAL CA SCREEN DOC REV: CPT | Performed by: INTERNAL MEDICINE

## 2018-06-21 RX ORDER — HYDROXYCHLOROQUINE SULFATE 200 MG/1
TABLET, FILM COATED ORAL
Refills: 3 | COMMUNITY
Start: 2018-06-02

## 2018-06-21 RX ORDER — CLONIDINE HYDROCHLORIDE 0.1 MG/1
TABLET ORAL
Refills: 3 | COMMUNITY
Start: 2018-05-24

## 2018-07-05 ENCOUNTER — HOSPITAL ENCOUNTER (OUTPATIENT)
Age: 73
Discharge: HOME | End: 2018-07-05
Payer: MEDICARE

## 2018-07-05 LAB — CORTISOL TOTAL: 7.25 MCG/DL (ref 2.68–18.4)

## 2018-07-05 PROCEDURE — 82384 ASSAY THREE CATECHOLAMINES: CPT

## 2018-07-05 PROCEDURE — 83835 ASSAY OF METANEPHRINES: CPT

## 2018-07-05 PROCEDURE — 82088 ASSAY OF ALDOSTERONE: CPT

## 2018-07-05 PROCEDURE — 36415 COLL VENOUS BLD VENIPUNCTURE: CPT

## 2018-07-05 PROCEDURE — 82533 TOTAL CORTISOL: CPT

## 2018-07-05 PROCEDURE — 84244 ASSAY OF RENIN: CPT

## 2018-07-08 LAB
CATECHOLAMINE INTERPRETATION, PLASMA: NORMAL
DOPAMINE PLASMA: <20 PG/ML (ref 0–20)
EPINEPHRINE PLASMA: 13 PG/ML (ref 10–200)
METANEPH/PLASMA INTERP: NORMAL
METANEPHRINE FREE PLASMA: 0.12 NMOL/L (ref 0–0.49)
NOREPINEPHRINE: 321 PG/ML (ref 80–520)
NORMETANEPHRINE FREE PLASMA: 0.35 NMOL/L (ref 0–0.89)

## 2018-07-09 LAB
Lab: NORMAL
REPORT: NORMAL
THIS TEST SENT TO: NORMAL

## 2018-08-23 DIAGNOSIS — I73.9 PVD (PERIPHERAL VASCULAR DISEASE) (HCC): Primary | ICD-10-CM

## 2019-09-25 ENCOUNTER — HOSPITAL ENCOUNTER (OUTPATIENT)
Dept: MRI IMAGING | Age: 74
Discharge: HOME | End: 2019-09-27
Payer: MEDICARE

## 2019-09-25 DIAGNOSIS — M19.90 OSTEOARTHRITIS, UNSPECIFIED OSTEOARTHRITIS TYPE, UNSPECIFIED SITE: ICD-10-CM

## 2019-09-25 DIAGNOSIS — M54.50 ACUTE MIDLINE LOW BACK PAIN WITHOUT SCIATICA: ICD-10-CM

## 2019-09-25 DIAGNOSIS — M54.16 LUMBAR RADICULOPATHY: ICD-10-CM

## 2019-09-25 PROCEDURE — 72148 MRI LUMBAR SPINE W/O DYE: CPT

## 2020-05-18 ENCOUNTER — HOSPITAL ENCOUNTER (OUTPATIENT)
Dept: MRI IMAGING | Age: 75
Discharge: HOME | End: 2020-05-20
Payer: MEDICARE

## 2020-06-01 ENCOUNTER — HOSPITAL ENCOUNTER (OUTPATIENT)
Age: 75
Discharge: HOME | End: 2020-06-03
Payer: MEDICARE

## 2020-06-01 LAB — PROSTATE SPECIFIC ANTIGEN: 3.87 NG/ML (ref 0–4)

## 2020-06-01 PROCEDURE — 84153 ASSAY OF PSA TOTAL: CPT

## 2020-07-14 ENCOUNTER — HOSPITAL ENCOUNTER (OUTPATIENT)
Dept: GENERAL RADIOLOGY | Age: 75
Discharge: HOME | End: 2020-07-16
Payer: MEDICARE

## 2020-07-14 ENCOUNTER — HOSPITAL ENCOUNTER (OUTPATIENT)
Age: 75
Discharge: HOME | End: 2020-07-16
Payer: MEDICARE

## 2020-07-14 PROCEDURE — 73502 X-RAY EXAM HIP UNI 2-3 VIEWS: CPT

## 2020-11-03 PROBLEM — I10 HYPERTENSION: Status: RESOLVED | Noted: 2020-11-03 | Resolved: 2020-11-03

## 2021-07-30 ENCOUNTER — OFFICE VISIT (OUTPATIENT)
Dept: PODIATRY | Age: 76
End: 2021-07-30
Payer: MEDICARE

## 2021-07-30 VITALS — WEIGHT: 260 LBS | HEIGHT: 69 IN | BODY MASS INDEX: 38.51 KG/M2

## 2021-07-30 DIAGNOSIS — E11.51 TYPE II DIABETES MELLITUS WITH PERIPHERAL CIRCULATORY DISORDER (HCC): ICD-10-CM

## 2021-07-30 DIAGNOSIS — B35.1 ONYCHOMYCOSIS: Primary | ICD-10-CM

## 2021-07-30 DIAGNOSIS — M79.675 PAIN OF TOE OF LEFT FOOT: ICD-10-CM

## 2021-07-30 DIAGNOSIS — R26.2 DIFFICULTY WALKING: ICD-10-CM

## 2021-07-30 DIAGNOSIS — I87.2 VENOUS INSUFFICIENCY (CHRONIC) (PERIPHERAL): ICD-10-CM

## 2021-07-30 DIAGNOSIS — M79.674 PAIN OF TOE OF RIGHT FOOT: ICD-10-CM

## 2021-07-30 PROCEDURE — 1123F ACP DISCUSS/DSCN MKR DOCD: CPT | Performed by: PODIATRIST

## 2021-07-30 PROCEDURE — 4040F PNEUMOC VAC/ADMIN/RCVD: CPT | Performed by: PODIATRIST

## 2021-07-30 PROCEDURE — 1036F TOBACCO NON-USER: CPT | Performed by: PODIATRIST

## 2021-07-30 PROCEDURE — G8428 CUR MEDS NOT DOCUMENT: HCPCS | Performed by: PODIATRIST

## 2021-07-30 PROCEDURE — 99203 OFFICE O/P NEW LOW 30 MIN: CPT | Performed by: PODIATRIST

## 2021-07-30 PROCEDURE — 11721 DEBRIDE NAIL 6 OR MORE: CPT | Performed by: PODIATRIST

## 2021-07-30 PROCEDURE — G8417 CALC BMI ABV UP PARAM F/U: HCPCS | Performed by: PODIATRIST

## 2021-07-30 RX ORDER — LOSARTAN POTASSIUM 50 MG/1
TABLET ORAL
COMMUNITY
Start: 2020-10-21 | End: 2021-07-30

## 2021-07-30 RX ORDER — DOXAZOSIN 2 MG/1
TABLET ORAL
COMMUNITY
Start: 2021-06-10

## 2021-07-30 RX ORDER — SPIRONOLACTONE 25 MG/1
TABLET ORAL
COMMUNITY
Start: 2021-05-18

## 2021-07-30 RX ORDER — GLIPIZIDE 2.5 MG/1
TABLET, EXTENDED RELEASE ORAL
COMMUNITY
Start: 2021-05-25

## 2021-07-30 NOTE — PROGRESS NOTES
21     Mary Blair    : 1945 Sex: male   Age: 67 y.o. Patient was referred by: None  Patient's PCP/Provider is:  Petrona Byrd MD    Subjective:    Patient is seen today for diabetic foot evaluation and mycotic nail care    Chief Complaint   Patient presents with    Diabetes    Nail Problem       HPI: Patient is unable to debride his nails appropriately at this time and would like that performed. He does have arthritic issues which limits his  strength and bending abilities. Patient does try to wear appropriate diabetic shoes and insoles which were previously dispensed for him. No other additional abnormalities noted at this time. ROS:  Const: Positives and pertinent negatives as per HPI. Musculo: Denies symptoms other than stated above. Neuro: Denies symptoms other than stated above. Skin: Denies symptoms other than stated above. Current Medications:    Current Outpatient Medications:     doxazosin (CARDURA) 2 MG tablet, TAKE 1 TABLET BY MOUTH DAILY, Disp: , Rfl:     glipiZIDE (GLUCOTROL XL) 2.5 MG extended release tablet, TAKE 2 TABLETS BY MOUTH EVERY DAY, Disp: , Rfl:     spironolactone (ALDACTONE) 25 MG tablet, TAKE 1 TABLET BY MOUTH EVERY DAY, Disp: , Rfl:     vitamin D 25 MCG (1000 UT) CAPS, Take by mouth, Disp: , Rfl:     hydroxychloroquine (PLAQUENIL) 200 MG tablet, TK 1 T PO BID, Disp: , Rfl: 3    metoprolol succinate (TOPROL XL) 25 MG extended release tablet, Take 3 tablets by mouth 2 times daily, Disp: 30 tablet, Rfl: 3    levothyroxine (SYNTHROID) 25 MCG tablet, Take 1 tablet by mouth Daily, Disp: 30 tablet, Rfl: 3    gabapentin (NEURONTIN) 300 MG capsule, Take 300 mg by mouth three times daily. ., Disp: , Rfl:     metformin (GLUCOPHAGE) 500 MG tablet, Take 1,000 mg by mouth 2 times daily (with meals) , Disp: , Rfl:     allopurinol (ZYLOPRIM) 300 MG tablet, Take 300 mg by mouth daily.   , Disp: , Rfl:     cloNIDine (CATAPRES) 0.1 MG tablet, TK 1 T PO TID (Patient not taking: Reported on 2021), Disp: , Rfl: 3    gabapentin (NEURONTIN) 300 MG capsule, Take 300 mg by mouth nightly. . (Patient not taking: Reported on 2021), Disp: , Rfl:     pravastatin (PRAVACHOL) 80 MG tablet, 80 mg daily. (Patient not taking: Reported on 2021), Disp: , Rfl: 3    lansoprazole (PREVACID) 15 MG capsule, Take 15 mg by mouth daily. (Patient not taking: Reported on 2021), Disp: , Rfl:     Allergies: Allergies   Allergen Reactions    Lipitor [Atorvastatin Calcium]        Vitals:    21 1002   Weight: 260 lb (117.9 kg)   Height: 5' 9\" (1.753 m)        Past Medical History:   Diagnosis Date    CAD (coronary artery disease)     Stent placement in the PDA with TAXUS Stent    Hypertension     PVCs (premature ventricular contractions)     occasional    Ventricular ectopy 2010     Family History   Problem Relation Age of Onset    Cancer Paternal Grandfather      Past Surgical History:   Procedure Laterality Date    BACK SURGERY      Lumbar Decompression    CARDIAC SURGERY      Cardiac Stent    CARDIOVASCULAR STRESS TEST  05    Moderate Risk, 1st pass suggests anterior apical wma on exercise, cannot rule out ischemia    CARDIOVASCULAR STRESS TEST  07    EF 48% with no wma, normal LV fxn, slight enlargement of LV.  CERVICAL FUSION      DIAG[facility]ST[facility]AC CATH LAB PROCEDURE      [facility]    DIAGNOSTIC CARDIAC CATH LAB PROCEDURE  2005    TAXUS Stent    DOPPLER ECHOCARDIOGRAPHY  02    LV dilated 6.6 cm, Mild RV enlargement, EF 45-50%    DOPPLER ECHOCARDIOGRAPHY  04    EF 55%, Concentric LVH, Left Atrial enlargement, Aortic Sclerosis.  Mild MR      Social History     Tobacco Use    Smoking status: Former Smoker     Packs/day: 1.50     Start date: 1962     Quit date: 1977     Years since quittin.7    Smokeless tobacco: Never Used    Tobacco comment: quit in early 1970's   Substance Use Topics    Alcohol use: No    Drug use: No           Diagnostic studies:    No results found. Procedures:    Debridement of nails 1, 2, 3, 4, 5 right foot and nails 1, 2, 3, 4, 5 left foot was performed with both manual and electrical debridement to prevent infection and/or ulceration. Patient tolerated the debridement well, without any complaints. Patient had no issues with debridement performed on today's visit. Exam:  VASCULAR: Pulses diminished to palpation bilateral foot. Capillary fill time delayed digits 1 through 5 bilateral foot. Minimal hair growth noted to both lower extremities. NEUROLOGICAL: Epicritic sensations intact and symmetrical  DERMATOLOGICAL: Nails 1 through 5 bilaterally are noted to be thickened, dystrophic, discolored, with subungual debris present and tenderness noted to palpation. Edematous issues noted to both lower extremities with both varicosities and stasis skin changes present bilaterally. MUSCULOSKELETAL: Mild contraction deformities noted lesser digits bilateral foot. Plan Per Assessment  Oliver Baum was seen today for diabetes and nail problem. Diagnoses and all orders for this visit:    Onychomycosis    Pain of toe of right foot    Pain of toe of left foot    Type II diabetes mellitus with peripheral circulatory disorder (HCC)    Venous insufficiency (chronic) (peripheral)    Difficulty walking        1. New patient evaluation and management  2. Mycotic nail debridement performed on today's visit as described above. 3. We did discuss appropriate diabetic foot care techniques with patient and his wife in detail today to prevent potential issues from occurring. Patient is to continue wearing his current diabetic shoes and insoles with all ambulatory activities. 4. Patient will be followed up in 2 months time or sooner if needed for reevaluation.       Seen By:    Juan Vasquez DPM    Electronically signed by Juan Vasquez DPM on 7/30/2021 at 6:33 PM      This note was created using voice recognition software. The note was reviewed however may contain grammatical errors.

## 2021-07-30 NOTE — PROGRESS NOTES
Patient in today for nail care and diabetic foot exam. Patient does not have any complaints of pain at this time.  Patient's PCP is Katherine Díaz MD date of last ov 7/12/2021        Kayla Anglin LPN

## 2021-10-01 ENCOUNTER — PROCEDURE VISIT (OUTPATIENT)
Dept: PODIATRY | Age: 76
End: 2021-10-01
Payer: MEDICARE

## 2021-10-01 VITALS — BODY MASS INDEX: 38.51 KG/M2 | HEIGHT: 69 IN | WEIGHT: 260 LBS

## 2021-10-01 DIAGNOSIS — R26.2 DIFFICULTY WALKING: ICD-10-CM

## 2021-10-01 DIAGNOSIS — M79.674 PAIN OF TOE OF RIGHT FOOT: ICD-10-CM

## 2021-10-01 DIAGNOSIS — M79.675 PAIN OF TOE OF LEFT FOOT: ICD-10-CM

## 2021-10-01 DIAGNOSIS — I73.9 PVD (PERIPHERAL VASCULAR DISEASE) (HCC): ICD-10-CM

## 2021-10-01 DIAGNOSIS — E11.51 TYPE II DIABETES MELLITUS WITH PERIPHERAL CIRCULATORY DISORDER (HCC): ICD-10-CM

## 2021-10-01 DIAGNOSIS — B35.1 ONYCHOMYCOSIS: Primary | ICD-10-CM

## 2021-10-01 PROCEDURE — 11721 DEBRIDE NAIL 6 OR MORE: CPT | Performed by: PODIATRIST

## 2021-10-01 NOTE — PROGRESS NOTES
Patient in today for nail care. Patient does not have any complaints of pain at this time.  Patient's PCP is Rosalinda Thomas MD date of last ov  7/14/2021       Alvaro Benjamin LPN

## 2021-10-01 NOTE — PROGRESS NOTES
10/1/21     Katie Lam    : 1945  Sex: male  Age: 67 y.o. Subjective: The patient is seen today for evaluation regarding diabetic foot evaluation and mycotic nail care. No other complaints noted. Chief Complaint   Patient presents with    Nail Problem       Current Medications:    Current Outpatient Medications:     doxazosin (CARDURA) 2 MG tablet, TAKE 1 TABLET BY MOUTH DAILY, Disp: , Rfl:     glipiZIDE (GLUCOTROL XL) 2.5 MG extended release tablet, TAKE 2 TABLETS BY MOUTH EVERY DAY, Disp: , Rfl:     spironolactone (ALDACTONE) 25 MG tablet, TAKE 1 TABLET BY MOUTH EVERY DAY, Disp: , Rfl:     vitamin D 25 MCG (1000 UT) CAPS, Take by mouth, Disp: , Rfl:     cloNIDine (CATAPRES) 0.1 MG tablet, TK 1 T PO TID, Disp: , Rfl: 3    hydroxychloroquine (PLAQUENIL) 200 MG tablet, TK 1 T PO BID, Disp: , Rfl: 3    metoprolol succinate (TOPROL XL) 25 MG extended release tablet, Take 3 tablets by mouth 2 times daily, Disp: 30 tablet, Rfl: 3    levothyroxine (SYNTHROID) 25 MCG tablet, Take 1 tablet by mouth Daily, Disp: 30 tablet, Rfl: 3    gabapentin (NEURONTIN) 300 MG capsule, Take 300 mg by mouth nightly. , Disp: , Rfl:     gabapentin (NEURONTIN) 300 MG capsule, Take 300 mg by mouth three times daily. ., Disp: , Rfl:     pravastatin (PRAVACHOL) 80 MG tablet, 80 mg daily , Disp: , Rfl: 3    lansoprazole (PREVACID) 15 MG capsule, Take 15 mg by mouth daily  , Disp: , Rfl:     metformin (GLUCOPHAGE) 500 MG tablet, Take 1,000 mg by mouth 2 times daily (with meals) , Disp: , Rfl:     allopurinol (ZYLOPRIM) 300 MG tablet, Take 300 mg by mouth daily. , Disp: , Rfl:     Allergies:   Allergies   Allergen Reactions    Lipitor [Atorvastatin Calcium]        Past Surgical History:   Procedure Laterality Date    BACK SURGERY  2004    Lumbar Decompression    CARDIAC SURGERY      Cardiac Stent    CARDIOVASCULAR STRESS TEST  05    Moderate Risk, 1st pass suggests anterior apical wma on exercise, debridement of the mycotic nails was performed for thickness and length to prevent injection and/or ulceration. 3. I recommended antifungal cream to the nails daily. 4. It was discussed in detail with the patient proper hygiene for the diabetic foot. They are to get in the habit of looking at their feet or have someone look at them. If they are unable to do daily, they are to look for any signs of redness, blistering, cracking, swelling, drainage, open lesions, etc.  They are to dry in between the toes after each bath or shower gently. The water should be tested with the elbow to prevent burns. The patient is to refrain from soaking their feet unless instructed by myself to do otherwise. They are to refrain from going barefoot. Shoe gear should be inspected for any foreign objects. Shoes should have a deep wide toe box. With any type of shoe, the feet should be inspected for any signs of pressure, i.e. redness, blistering, or open sores. Further instructional guidelines were dispensed to the patient. 5. We will see the patient back at a later date for continued podiatric management and care. Patient was advised to call the office with any questions or concerns prior to their next appointment if needed. Seen By:    Lisa Fournier DPM    Electronically signed by Lisa Fournier DPM on 10/1/2021 at 10:30 AM      This note was created using voice recognition software. The note was reviewed however may contain grammatical errors.

## 2021-10-22 ENCOUNTER — HOSPITAL ENCOUNTER (OUTPATIENT)
Dept: CARDIOLOGY | Age: 76
Discharge: HOME | End: 2021-10-22
Payer: MEDICARE

## 2021-10-22 ENCOUNTER — TELEPHONE (OUTPATIENT)
Dept: PODIATRY | Age: 76
End: 2021-10-22

## 2021-10-22 DIAGNOSIS — E11.8 TYPE 2 DIABETES MELLITUS WITH COMPLICATION, WITHOUT LONG-TERM CURRENT USE OF INSULIN (HCC): Primary | ICD-10-CM

## 2021-10-22 DIAGNOSIS — L60.0 OC (ONYCHOCRYPTOSIS): ICD-10-CM

## 2021-10-22 LAB
LV EF: 63 %
LVEF MODALITY: NORMAL

## 2021-10-22 PROCEDURE — 93306 TTE W/DOPPLER COMPLETE: CPT

## 2021-10-22 RX ORDER — DOXYCYCLINE HYCLATE 100 MG
100 TABLET ORAL 2 TIMES DAILY
Qty: 28 TABLET | Refills: 0 | Status: SHIPPED | OUTPATIENT
Start: 2021-10-22 | End: 2021-11-05

## 2021-10-22 NOTE — TELEPHONE ENCOUNTER
Patient's wife called in stating that his right great toenail is red and bleeding. Patient is unable to make it for an appointment today. Patient is requesting a treatment option to help with the toe. Thank you.

## 2021-10-25 ENCOUNTER — OFFICE VISIT (OUTPATIENT)
Dept: PODIATRY | Age: 76
End: 2021-10-25
Payer: MEDICARE

## 2021-10-25 VITALS — WEIGHT: 260 LBS | HEIGHT: 69 IN | BODY MASS INDEX: 38.51 KG/M2

## 2021-10-25 DIAGNOSIS — E11.8 TYPE 2 DIABETES MELLITUS WITH COMPLICATION, WITHOUT LONG-TERM CURRENT USE OF INSULIN (HCC): ICD-10-CM

## 2021-10-25 DIAGNOSIS — L60.0 OC (ONYCHOCRYPTOSIS): Primary | ICD-10-CM

## 2021-10-25 PROCEDURE — G8417 CALC BMI ABV UP PARAM F/U: HCPCS | Performed by: PODIATRIST

## 2021-10-25 PROCEDURE — G8484 FLU IMMUNIZE NO ADMIN: HCPCS | Performed by: PODIATRIST

## 2021-10-25 PROCEDURE — 99213 OFFICE O/P EST LOW 20 MIN: CPT | Performed by: PODIATRIST

## 2021-10-25 PROCEDURE — 1036F TOBACCO NON-USER: CPT | Performed by: PODIATRIST

## 2021-10-25 PROCEDURE — 4040F PNEUMOC VAC/ADMIN/RCVD: CPT | Performed by: PODIATRIST

## 2021-10-25 PROCEDURE — 1123F ACP DISCUSS/DSCN MKR DOCD: CPT | Performed by: PODIATRIST

## 2021-10-25 PROCEDURE — G8427 DOCREV CUR MEDS BY ELIG CLIN: HCPCS | Performed by: PODIATRIST

## 2021-10-25 NOTE — PROGRESS NOTES
Patient has concerns about right great ingrown toenail. Patient states has been going on for 2 weeks.  Eriberto Mayfield MD  Electronically signed by Yehuda Solitario LPN on 30/65/9668 at 10:42 AM

## 2021-10-25 NOTE — PROGRESS NOTES
10/25/21     Kwadwo Dumont    : 1945   Sex: male    Age: 67 y.o. Patient's PCP/Provider is:  John Hu MD    Subjective:  Patient is seen today for evaluation regarding toenail issue right great toe. Patient was placed on oral antibiotics which she is currently taking over the last 3 days. Patient is in no acute distress. He denies any nausea, vomiting, fever, chills. Chief Complaint   Patient presents with    Ingrown Toenail     right great nail        ROS:  Const: Positives and pertinent negatives as per HPI. Musculo: Denies symptoms other than stated above. Neuro: Denies symptoms other than stated above. Skin: Denies symptoms other than stated above. Current Medications:    Current Outpatient Medications:     doxycycline hyclate (VIBRA-TABS) 100 MG tablet, Take 1 tablet by mouth 2 times daily for 14 days, Disp: 28 tablet, Rfl: 0    doxazosin (CARDURA) 2 MG tablet, TAKE 1 TABLET BY MOUTH DAILY, Disp: , Rfl:     glipiZIDE (GLUCOTROL XL) 2.5 MG extended release tablet, TAKE 2 TABLETS BY MOUTH EVERY DAY, Disp: , Rfl:     spironolactone (ALDACTONE) 25 MG tablet, TAKE 1 TABLET BY MOUTH EVERY DAY, Disp: , Rfl:     vitamin D 25 MCG (1000 UT) CAPS, Take by mouth, Disp: , Rfl:     cloNIDine (CATAPRES) 0.1 MG tablet, TK 1 T PO TID, Disp: , Rfl: 3    hydroxychloroquine (PLAQUENIL) 200 MG tablet, TK 1 T PO BID, Disp: , Rfl: 3    metoprolol succinate (TOPROL XL) 25 MG extended release tablet, Take 3 tablets by mouth 2 times daily, Disp: 30 tablet, Rfl: 3    levothyroxine (SYNTHROID) 25 MCG tablet, Take 1 tablet by mouth Daily, Disp: 30 tablet, Rfl: 3    gabapentin (NEURONTIN) 300 MG capsule, Take 300 mg by mouth nightly. , Disp: , Rfl:     gabapentin (NEURONTIN) 300 MG capsule, Take 300 mg by mouth three times daily.  ., Disp: , Rfl:     pravastatin (PRAVACHOL) 80 MG tablet, 80 mg daily , Disp: , Rfl: 3    lansoprazole (PREVACID) 15 MG capsule, Take 15 mg by mouth daily  , Disp: , Rfl:     metformin (GLUCOPHAGE) 500 MG tablet, Take 1,000 mg by mouth 2 times daily (with meals) , Disp: , Rfl:     allopurinol (ZYLOPRIM) 300 MG tablet, Take 300 mg by mouth daily. , Disp: , Rfl:     Allergies: Allergies   Allergen Reactions    Lipitor [Atorvastatin Calcium]        Vitals:    10/25/21 1041   Weight: 260 lb (117.9 kg)   Height: 5' 9\" (1.753 m)       Exam:  Neurovascular status unchanged. Lateral border right great toe is incurvated with tenderness noted to palpation. Minimal edema and erythema noted right great toe. No abscess formation, or any ascending cellulitic issues noted right foot. No maceration of webspaces noted right foot. Diagnostic Studies:     No results found. Procedures:    None    Plan Per Assessment  Judy Palencia was seen today for ingrown toenail. Diagnoses and all orders for this visit:    OC (onychocryptosis)    Type 2 diabetes mellitus with complication, without long-term current use of insulin ([facility] [address]      1. Evaluation and management  2. Debridement ingrown nail was performed to patient tolerance right great toe. No nail matrix procedures performed on today's visit. 3. We did recommend continued use of the oral antibiotic until finished. 4. We did discuss additional diabetic foot care techniques with patient in detail today. We will have continued follow-up until issues are resolved. Seen By:    Nancy Madera DPM    Electronically signed by Nancy Mdaera DPM on 10/25/2021 at 11:10 AM    This note was created using voice recognition software. The note was reviewed however may contain grammatical errors.

## 2022-05-04 ENCOUNTER — OFFICE VISIT (OUTPATIENT)
Dept: PODIATRY | Age: 77
End: 2022-05-04
Payer: MEDICARE

## 2022-05-04 VITALS — BODY MASS INDEX: 38.51 KG/M2 | HEIGHT: 69 IN | WEIGHT: 260 LBS

## 2022-05-04 DIAGNOSIS — L03.032 CELLULITIS OF GREAT TOE OF LEFT FOOT: ICD-10-CM

## 2022-05-04 DIAGNOSIS — R26.2 DIFFICULTY WALKING: ICD-10-CM

## 2022-05-04 DIAGNOSIS — L60.0 OC (ONYCHOCRYPTOSIS): Primary | ICD-10-CM

## 2022-05-04 DIAGNOSIS — E11.51 TYPE II DIABETES MELLITUS WITH PERIPHERAL CIRCULATORY DISORDER (HCC): ICD-10-CM

## 2022-05-04 PROCEDURE — 1036F TOBACCO NON-USER: CPT | Performed by: PODIATRIST

## 2022-05-04 PROCEDURE — 4040F PNEUMOC VAC/ADMIN/RCVD: CPT | Performed by: PODIATRIST

## 2022-05-04 PROCEDURE — G8417 CALC BMI ABV UP PARAM F/U: HCPCS | Performed by: PODIATRIST

## 2022-05-04 PROCEDURE — 99213 OFFICE O/P EST LOW 20 MIN: CPT | Performed by: PODIATRIST

## 2022-05-04 PROCEDURE — G8427 DOCREV CUR MEDS BY ELIG CLIN: HCPCS | Performed by: PODIATRIST

## 2022-05-04 PROCEDURE — 1123F ACP DISCUSS/DSCN MKR DOCD: CPT | Performed by: PODIATRIST

## 2022-05-04 RX ORDER — DOXYCYCLINE HYCLATE 100 MG
100 TABLET ORAL 2 TIMES DAILY
Qty: 28 TABLET | Refills: 0 | Status: SHIPPED | OUTPATIENT
Start: 2022-05-04 | End: 2022-05-18

## 2022-05-04 NOTE — PROGRESS NOTES
22     Alexia Trammell    : 1945   Sex: male    Age: 67 y.o. Patient's PCP/Provider is:  Katherine Díaz MD    Subjective:  Patient is seen today for evaluation regarding chronic ingrown nail left great toe. Stated he has had issues with ingrown nails since this past February. Patient was having concerns due to increased redness and swelling to the left great toe. During patient's vacation in [place] over the last several months he was getting treated for some plantar fascial symptoms. No other additional abnormalities noted. Patient is in no acute distress. He denies any nausea, vomiting, fever, chills. Chief Complaint   Patient presents with    Nail Problem     left great toe        ROS:  Const: Positives and pertinent negatives as per HPI. Musculo: Denies symptoms other than stated above. Neuro: Denies symptoms other than stated above. Skin: Denies symptoms other than stated above.     Current Medications:    Current Outpatient Medications:     doxycycline hyclate (VIBRA-TABS) 100 MG tablet, Take 1 tablet by mouth 2 times daily for 14 days, Disp: 28 tablet, Rfl: 0    doxazosin (CARDURA) 2 MG tablet, TAKE 1 TABLET BY MOUTH DAILY, Disp: , Rfl:     glipiZIDE (GLUCOTROL XL) 2.5 MG extended release tablet, TAKE 2 TABLETS BY MOUTH EVERY DAY, Disp: , Rfl:     spironolactone (ALDACTONE) 25 MG tablet, TAKE 1 TABLET BY MOUTH EVERY DAY, Disp: , Rfl:     vitamin D 25 MCG (1000 UT) CAPS, Take by mouth, Disp: , Rfl:     cloNIDine (CATAPRES) 0.1 MG tablet, TK 1 T PO TID, Disp: , Rfl: 3    hydroxychloroquine (PLAQUENIL) 200 MG tablet, TK 1 T PO BID, Disp: , Rfl: 3    metoprolol succinate (TOPROL XL) 25 MG extended release tablet, Take 3 tablets by mouth 2 times daily, Disp: 30 tablet, Rfl: 3    levothyroxine (SYNTHROID) 25 MCG tablet, Take 1 tablet by mouth Daily, Disp: 30 tablet, Rfl: 3    gabapentin (NEURONTIN) 300 MG capsule, Take 300 mg by mouth nightly. , Disp: , Rfl:     gabapentin (NEURONTIN) 300 MG capsule, Take 300 mg by mouth three times daily. ., Disp: , Rfl:     pravastatin (PRAVACHOL) 80 MG tablet, 80 mg daily , Disp: , Rfl: 3    lansoprazole (PREVACID) 15 MG capsule, Take 15 mg by mouth daily  , Disp: , Rfl:     metformin (GLUCOPHAGE) 500 MG tablet, Take 1,000 mg by mouth 2 times daily (with meals) , Disp: , Rfl:     allopurinol (ZYLOPRIM) 300 MG tablet, Take 300 mg by mouth daily. , Disp: , Rfl:     Allergies: Allergies   Allergen Reactions    Lipitor [Atorvastatin Calcium]        Vitals:    05/04/22 1028   Weight: 260 lb (117.9 kg)   Height: 5' 9\" (1.753 m)       Exam:  Neurovascular status unchanged. Ingrown nail issue noted medial border left great toe. Mild erythema and edema noted left great toe. No purulence, skin crepitation, or any ascending cellulitic issues noted left great toe. No maceration webspaces noted left foot. No plantar calluses, ulcerations, heel fissuring noted left foot. Diagnostic Studies:     No results found. Procedures:    None    Plan Per Assessment  Olga Villarreal was seen today for nail problem. Diagnoses and all orders for this visit:    OC (onychocryptosis)  -     doxycycline hyclate (VIBRA-TABS) 100 MG tablet; Take 1 tablet by mouth 2 times daily for 14 days    Cellulitis of great toe of left foot  -     doxycycline hyclate (VIBRA-TABS) 100 MG tablet; Take 1 tablet by mouth 2 times daily for 14 days    Type II diabetes mellitus with peripheral circulatory disorder (HCC)    Difficulty walking      1. Evaluation and management  2. Debridement ingrown nail was performed to patient tolerance left great toe. No nail matrix procedure performed on today's visit. 3. Topical antibiotic dry dressing applied to the left great toe nail site which patient and his wife were advised on performing daily over the next 4 to 5 days. 4. Prescription for oral doxycycline was given on today's visit as well for patient to take as directed.   5. Patient will be followed up at a later date for continued evaluation and management until issues are resolved. Seen By:    Rosanne Cheng DPM    Electronically signed by Rosanne Cheng DPM on 5/4/2022 at 11:36 AM    This note was created using voice recognition software. The note was reviewed however may contain grammatical errors.

## 2022-05-04 NOTE — PROGRESS NOTES
Patient is here for concerns left great toe. Patient states redness and swelling.  Mona Cherry MD  Electronically signed by Corina Witt LPN on 1/2/3170 at 24:34 AM

## 2022-05-11 ENCOUNTER — OFFICE VISIT (OUTPATIENT)
Dept: PODIATRY | Age: 77
End: 2022-05-11
Payer: MEDICARE

## 2022-05-11 VITALS — WEIGHT: 260 LBS | BODY MASS INDEX: 38.51 KG/M2 | HEIGHT: 69 IN

## 2022-05-11 DIAGNOSIS — E11.8 TYPE 2 DIABETES MELLITUS WITH COMPLICATION, WITHOUT LONG-TERM CURRENT USE OF INSULIN (HCC): ICD-10-CM

## 2022-05-11 DIAGNOSIS — I87.2 VENOUS INSUFFICIENCY (CHRONIC) (PERIPHERAL): Primary | ICD-10-CM

## 2022-05-11 DIAGNOSIS — R60.0 LOCALIZED EDEMA: ICD-10-CM

## 2022-05-11 PROCEDURE — 1123F ACP DISCUSS/DSCN MKR DOCD: CPT | Performed by: PODIATRIST

## 2022-05-11 PROCEDURE — G8417 CALC BMI ABV UP PARAM F/U: HCPCS | Performed by: PODIATRIST

## 2022-05-11 PROCEDURE — G8427 DOCREV CUR MEDS BY ELIG CLIN: HCPCS | Performed by: PODIATRIST

## 2022-05-11 PROCEDURE — 29580 STRAPPING UNNA BOOT: CPT | Performed by: PODIATRIST

## 2022-05-11 PROCEDURE — 99213 OFFICE O/P EST LOW 20 MIN: CPT | Performed by: PODIATRIST

## 2022-05-11 PROCEDURE — 1036F TOBACCO NON-USER: CPT | Performed by: PODIATRIST

## 2022-05-11 PROCEDURE — 4040F PNEUMOC VAC/ADMIN/RCVD: CPT | Performed by: PODIATRIST

## 2022-05-11 NOTE — PROGRESS NOTES
Patient is here for follow up left great toe. Patient states improvement.  Jazzmine Avila MD  Electronically signed by Belinda Marte LPN on 0/94/5502 at 67:99 AM

## 2022-05-11 NOTE — PROGRESS NOTES
22     Shashank Mancilla    : 1945   Sex: male    Age: 67 y.o. Patient's PCP/Provider is:  Mona Cherry MD    Subjective:  Patient is seen today for evaluation regarding follow-up ingrown nail left great toe. Patient stated the ingrown nail is doing well at this time and is not causing any current issues. Patient has been having issues with chronic swelling to the left lower extremity which does cause some ankle and foot disability. No other additional abnormalities noted at this time. Chief Complaint   Patient presents with    Follow-up     left great toe        ROS:  Const: Positives and pertinent negatives as per HPI. Musculo: Denies symptoms other than stated above. Neuro: Denies symptoms other than stated above. Skin: Denies symptoms other than stated above. Current Medications:    Current Outpatient Medications:     doxycycline hyclate (VIBRA-TABS) 100 MG tablet, Take 1 tablet by mouth 2 times daily for 14 days, Disp: 28 tablet, Rfl: 0    doxazosin (CARDURA) 2 MG tablet, TAKE 1 TABLET BY MOUTH DAILY, Disp: , Rfl:     glipiZIDE (GLUCOTROL XL) 2.5 MG extended release tablet, TAKE 2 TABLETS BY MOUTH EVERY DAY, Disp: , Rfl:     spironolactone (ALDACTONE) 25 MG tablet, TAKE 1 TABLET BY MOUTH EVERY DAY, Disp: , Rfl:     vitamin D 25 MCG (1000 UT) CAPS, Take by mouth, Disp: , Rfl:     cloNIDine (CATAPRES) 0.1 MG tablet, TK 1 T PO TID, Disp: , Rfl: 3    hydroxychloroquine (PLAQUENIL) 200 MG tablet, TK 1 T PO BID, Disp: , Rfl: 3    metoprolol succinate (TOPROL XL) 25 MG extended release tablet, Take 3 tablets by mouth 2 times daily, Disp: 30 tablet, Rfl: 3    levothyroxine (SYNTHROID) 25 MCG tablet, Take 1 tablet by mouth Daily, Disp: 30 tablet, Rfl: 3    gabapentin (NEURONTIN) 300 MG capsule, Take 300 mg by mouth nightly. , Disp: , Rfl:     gabapentin (NEURONTIN) 300 MG capsule, Take 300 mg by mouth three times daily.  ., Disp: , Rfl:     pravastatin (PRAVACHOL) 80 MG tablet, 80 mg daily , Disp: , Rfl: 3    lansoprazole (PREVACID) 15 MG capsule, Take 15 mg by mouth daily  , Disp: , Rfl:     metformin (GLUCOPHAGE) 500 MG tablet, Take 1,000 mg by mouth 2 times daily (with meals) , Disp: , Rfl:     allopurinol (ZYLOPRIM) 300 MG tablet, Take 300 mg by mouth daily. , Disp: , Rfl:     Allergies: Allergies   Allergen Reactions    Lipitor [Atorvastatin Calcium]        Vitals:    05/11/22 1031   Weight: 260 lb (117.9 kg)   Height: 5' 9\" (1.753 m)       Exam:  Neurovascular status unchanged. Ingrown nail issue resolved left great toe. Edematous issues noted left lower extremity without any active open ulcerative areas. No signs of infection noted left lower extremity. Mild antalgic gait noted upon evaluation. Diagnostic Studies:     No results found. Procedures: An unna boot  compressive wrap was applied to the left lower extremity. It's purpose is to  decrease  the amount of edema present, and to allow proper healing of the soft tissues. The patient was instructed to keep the unna boot clean, dry and intact until the next follow up visit. The patient was instructed to look for signs of redness, irritation, blistering and pain. If these or any other symptoms were to develop, they were advised to contact the office immediately for reevaluation. Plan Per Assessment  Yashira Ferreira was seen today for follow-up. Diagnoses and all orders for this visit:    Venous insufficiency (chronic) (peripheral)    Localized edema    Type 2 diabetes mellitus with complication, without long-term current use of insulin ([address]      1. Evaluation and management  2. Compression dressing was applied symptomatic left lower extremity as described above to reduce dependent edematous issues. 3. We did discuss additional diabetic foot care techniques with patient in detail today. 4. Patient will be followed up at a later date for continued evaluation and care.   We will discuss potentially getting patient fitted for an ankle support on next visit due to some ankle instability issues he has been experiencing. They were advised to call the office with any questions or concerns in the interim. Seen By:    Gail Wallace DPM    Electronically signed by Gail Wallace DPM on 5/11/2022 at 2:07 PM    This note was created using voice recognition software. The note was reviewed however may contain grammatical errors.

## 2022-05-18 ENCOUNTER — OFFICE VISIT (OUTPATIENT)
Dept: PODIATRY | Age: 77
End: 2022-05-18
Payer: MEDICARE

## 2022-05-18 VITALS — HEIGHT: 69 IN | BODY MASS INDEX: 38.51 KG/M2 | WEIGHT: 260 LBS

## 2022-05-18 DIAGNOSIS — L60.0 OC (ONYCHOCRYPTOSIS): Primary | ICD-10-CM

## 2022-05-18 DIAGNOSIS — E11.51 TYPE II DIABETES MELLITUS WITH PERIPHERAL CIRCULATORY DISORDER (HCC): ICD-10-CM

## 2022-05-18 PROBLEM — L03.032 CELLULITIS OF GREAT TOE OF LEFT FOOT: Status: RESOLVED | Noted: 2022-05-04 | Resolved: 2022-05-18

## 2022-05-18 PROCEDURE — G8427 DOCREV CUR MEDS BY ELIG CLIN: HCPCS | Performed by: PODIATRIST

## 2022-05-18 PROCEDURE — 1036F TOBACCO NON-USER: CPT | Performed by: PODIATRIST

## 2022-05-18 PROCEDURE — 1123F ACP DISCUSS/DSCN MKR DOCD: CPT | Performed by: PODIATRIST

## 2022-05-18 PROCEDURE — 4040F PNEUMOC VAC/ADMIN/RCVD: CPT | Performed by: PODIATRIST

## 2022-05-18 PROCEDURE — G8417 CALC BMI ABV UP PARAM F/U: HCPCS | Performed by: PODIATRIST

## 2022-05-18 PROCEDURE — 99213 OFFICE O/P EST LOW 20 MIN: CPT | Performed by: PODIATRIST

## 2022-05-18 NOTE — PROGRESS NOTES
Patient is here for follow up unna boot. Patient left unna boot on for 4 days. Patient states no difference when taking the unna boot off.   Kristina Curry MD  Electronically signed by Jennett Gosselin, LPN on 4/60/6453 at 52:66 AM

## 2022-05-18 NOTE — PROGRESS NOTES
22     Bharti Vargas    : 1945   Sex: male    Age: 67 y.o. Patient's PCP/Provider is:  Madeline Beverly MD    Subjective:  Patient is seen today for follow-up regarding continued evaluation regarding ingrown nail issue left great toe. Overall patient is doing great at this time with minimal issues noted. Stated the compression dressing applied due to some residual edematous issues left lower extremity helped mildly with his symptoms. His wife did bring his ankle support with him today for evaluation. No other additional abnormalities noted. Chief Complaint   Patient presents with    Follow-up     unna boot        ROS:  Const: Positives and pertinent negatives as per HPI. Musculo: Denies symptoms other than stated above. Neuro: Denies symptoms other than stated above. Skin: Denies symptoms other than stated above. Current Medications:    Current Outpatient Medications:     doxycycline hyclate (VIBRA-TABS) 100 MG tablet, Take 1 tablet by mouth 2 times daily for 14 days, Disp: 28 tablet, Rfl: 0    doxazosin (CARDURA) 2 MG tablet, TAKE 1 TABLET BY MOUTH DAILY, Disp: , Rfl:     glipiZIDE (GLUCOTROL XL) 2.5 MG extended release tablet, TAKE 2 TABLETS BY MOUTH EVERY DAY, Disp: , Rfl:     spironolactone (ALDACTONE) 25 MG tablet, TAKE 1 TABLET BY MOUTH EVERY DAY, Disp: , Rfl:     vitamin D 25 MCG (1000 UT) CAPS, Take by mouth, Disp: , Rfl:     cloNIDine (CATAPRES) 0.1 MG tablet, TK 1 T PO TID, Disp: , Rfl: 3    hydroxychloroquine (PLAQUENIL) 200 MG tablet, TK 1 T PO BID, Disp: , Rfl: 3    metoprolol succinate (TOPROL XL) 25 MG extended release tablet, Take 3 tablets by mouth 2 times daily, Disp: 30 tablet, Rfl: 3    levothyroxine (SYNTHROID) 25 MCG tablet, Take 1 tablet by mouth Daily, Disp: 30 tablet, Rfl: 3    gabapentin (NEURONTIN) 300 MG capsule, Take 300 mg by mouth nightly. , Disp: , Rfl:     gabapentin (NEURONTIN) 300 MG capsule, Take 300 mg by mouth three times daily.  ., Disp: , Rfl:     pravastatin (PRAVACHOL) 80 MG tablet, 80 mg daily , Disp: , Rfl: 3    lansoprazole (PREVACID) 15 MG capsule, Take 15 mg by mouth daily  , Disp: , Rfl:     metformin (GLUCOPHAGE) 500 MG tablet, Take 1,000 mg by mouth 2 times daily (with meals) , Disp: , Rfl:     allopurinol (ZYLOPRIM) 300 MG tablet, Take 300 mg by mouth daily. , Disp: , Rfl:     Allergies: Allergies   Allergen Reactions    Lipitor [Atorvastatin Calcium]        Vitals:    05/18/22 1049   Weight: 260 lb (117.9 kg)   Height: 5' 9\" (1.753 m)       Exam:  Neurovascular status unchanged. Ingrown nail issue resolved left great toe. Edematous issues stable to both lower extremities. Increased range of motion noted left ankle and subtalar joint region. Stable gait noted upon evaluation. Diagnostic Studies:     No results found. Procedures:    None    Plan Per Assessment  Nely Darnell was seen today for follow-up. Diagnoses and all orders for this visit:    OC (onychocryptosis)    Type II diabetes mellitus with peripheral circulatory disorder ([facility] [address]      1. Evaluation and management  2. We did discuss appropriate diabetic foot care techniques with patient in detail today. 3. We did discuss appropriate nail care techniques to prevent reoccurrence of symptoms. Patient was advised on utilizing his ankle support with increased activities to prevent reoccurrence of symptoms left lower extremity. 4. Patient will be followed up for his regular scheduled foot care appointment or sooner if needed. Seen By:    Gail Wallace DPM    Electronically signed by Gail Wallace DPM on 5/18/2022 at 11:20 AM    This note was created using voice recognition software. The note was reviewed however may contain grammatical errors.

## 2022-07-27 ENCOUNTER — PROCEDURE VISIT (OUTPATIENT)
Dept: PODIATRY | Age: 77
End: 2022-07-27
Payer: MEDICARE

## 2022-07-27 VITALS — WEIGHT: 260 LBS | HEIGHT: 69 IN | BODY MASS INDEX: 38.51 KG/M2

## 2022-07-27 DIAGNOSIS — B35.1 ONYCHOMYCOSIS: Primary | ICD-10-CM

## 2022-07-27 DIAGNOSIS — M79.675 PAIN OF TOE OF LEFT FOOT: ICD-10-CM

## 2022-07-27 DIAGNOSIS — E11.51 TYPE II DIABETES MELLITUS WITH PERIPHERAL CIRCULATORY DISORDER (HCC): ICD-10-CM

## 2022-07-27 DIAGNOSIS — M79.674 PAIN OF TOE OF RIGHT FOOT: ICD-10-CM

## 2022-07-27 DIAGNOSIS — R26.2 DIFFICULTY WALKING: ICD-10-CM

## 2022-07-27 DIAGNOSIS — I87.2 VENOUS INSUFFICIENCY (CHRONIC) (PERIPHERAL): ICD-10-CM

## 2022-07-27 PROCEDURE — 11721 DEBRIDE NAIL 6 OR MORE: CPT | Performed by: PODIATRIST

## 2022-07-27 NOTE — PROGRESS NOTES
22     Earnest Payment    : 1945  Sex: male  Age: 65 y.o. Subjective: The patient is seen today for evaluation regarding diabetic foot evaluation and mycotic nail care. No other complaints noted. Chief Complaint   Patient presents with    Nail Problem       Current Medications:    Current Outpatient Medications:     doxazosin (CARDURA) 2 MG tablet, TAKE 1 TABLET BY MOUTH DAILY, Disp: , Rfl:     glipiZIDE (GLUCOTROL XL) 2.5 MG extended release tablet, TAKE 2 TABLETS BY MOUTH EVERY DAY, Disp: , Rfl:     spironolactone (ALDACTONE) 25 MG tablet, TAKE 1 TABLET BY MOUTH EVERY DAY, Disp: , Rfl:     vitamin D 25 MCG (1000 UT) CAPS, Take by mouth, Disp: , Rfl:     cloNIDine (CATAPRES) 0.1 MG tablet, TK 1 T PO TID, Disp: , Rfl: 3    hydroxychloroquine (PLAQUENIL) 200 MG tablet, TK 1 T PO BID, Disp: , Rfl: 3    metoprolol succinate (TOPROL XL) 25 MG extended release tablet, Take 3 tablets by mouth 2 times daily, Disp: 30 tablet, Rfl: 3    levothyroxine (SYNTHROID) 25 MCG tablet, Take 1 tablet by mouth Daily, Disp: 30 tablet, Rfl: 3    gabapentin (NEURONTIN) 300 MG capsule, Take 300 mg by mouth nightly. , Disp: , Rfl:     gabapentin (NEURONTIN) 300 MG capsule, Take 300 mg by mouth three times daily. ., Disp: , Rfl:     pravastatin (PRAVACHOL) 80 MG tablet, 80 mg daily , Disp: , Rfl: 3    lansoprazole (PREVACID) 15 MG capsule, Take 15 mg by mouth daily  , Disp: , Rfl:     metformin (GLUCOPHAGE) 500 MG tablet, Take 1,000 mg by mouth 2 times daily (with meals) , Disp: , Rfl:     allopurinol (ZYLOPRIM) 300 MG tablet, Take 300 mg by mouth daily. , Disp: , Rfl:     Allergies:   Allergies   Allergen Reactions    Lipitor [Atorvastatin Calcium]        Past Surgical History:   Procedure Laterality Date    BACK SURGERY  2004    Lumbar Decompression    CARDIAC SURGERY      Cardiac Stent    CARDIOVASCULAR STRESS TEST  05    Moderate Risk, 1st pass suggests anterior apical wma on exercise, cannot rule out ischemia    CARDIOVASCULAR STRESS TEST  2/20/07    EF 48% with no wma, normal LV fxn, slight enlargement of LV. CERVICAL FUSION  2004    DIAGNOSTIC CARDIAC CATH LAB PROCEDURE  1993    [facility]    DIAGNOSTIC CARDIAC CATH LAB PROCEDURE  12/27/2005    TAXUS Stent    DOPPLER ECHOCARDIOGRAPHY  4/6/02    LV dilated 6.6 cm, Mild RV enlargement, EF 45-50%    DOPPLER ECHOCARDIOGRAPHY  9/13/04    EF 55%, Concentric LVH, Left Atrial enlargement, Aortic Sclerosis. Mild MR      Past Medical History:   Diagnosis Date    CAD (coronary artery disease) 2005    Stent placement in the PDA with TAXUS Stent    Hypertension     PVCs (premature ventricular contractions) 2011    occasional    Ventricular ectopy 2010       Vitals:    07/27/22 1044   Weight: 260 lb (117.9 kg)   Height: 5' 9\" (1.753 m)       Exam:  Pedal pulses diminished to palpation bilateral foot. Capillary refill time delayed digital regions bilateral foot. At this time the nail/s 1, 2, 3, 4, 5 right foot and nail/s 1, 2, 3, 4, 5 left foot are noted to be thickened, dystrophic and discolored with subungual debris present. Tenderness noted to palpation. Minimal hair growth is noted to both lower extremities. Edema noted with both varicosities and stasis skin changes present bilaterally. Coolness is noted to the digital regions to palpation. Capillary fill time delayed digital areas bilateral foot. No heel fissuring or macerations of the web spaces. No plantar calluses and/or ulcerative areas are noted. Patient is having difficulty with gait/walking. Plan Per Assessment  Landon Walker was seen today for nail problem. Diagnoses and all orders for this visit:    Onychomycosis    Pain of toe of right foot    Pain of toe of left foot    Venous insufficiency (chronic) (peripheral)    Type II diabetes mellitus with peripheral circulatory disorder (HCC)    Difficulty walking        1. Evaluation and Management  2.  Manual and electrical debridement of the mycotic nails was performed for thickness and length to prevent injection and/or ulceration. 3. Discussed additional diabetic lower extremity care techniques with patient today. 4. It was discussed in detail with the patient proper caring for the vascular compromised foot. The fact that they have compromised blood flow put the patient at risk for infection/gangrene/amputation. The patient should not walk barefoot. Shoe gear should fit properly and socks should be worn with shoes. If any skin lesions are noted, they are instructed to contact the office immediately. 5. We will see the patient back at a later date for continued podiatric management and care. Patient was advised to call the office with any questions or concerns prior to their next appointment if needed. Seen By:    Chapin Khan DPM    Electronically signed by Chapin Khan DPM on 7/27/2022 at 11:08 AM      This note was created using voice recognition software. The note was reviewed however may contain grammatical errors.

## 2022-07-27 NOTE — PROGRESS NOTES
Patient in today for nail care. Patient does not have any complaints of pain at this time.  Patient's PCP is Ella Foreman MD date of last ov 07/05/2022        Kaycee Campuzano LPN

## 2022-10-03 ENCOUNTER — PROCEDURE VISIT (OUTPATIENT)
Dept: PODIATRY | Age: 77
End: 2022-10-03
Payer: MEDICARE

## 2022-10-03 VITALS — HEIGHT: 70 IN | BODY MASS INDEX: 37.22 KG/M2 | WEIGHT: 260 LBS

## 2022-10-03 DIAGNOSIS — B35.1 ONYCHOMYCOSIS: Primary | ICD-10-CM

## 2022-10-03 DIAGNOSIS — M79.675 PAIN OF TOE OF LEFT FOOT: ICD-10-CM

## 2022-10-03 DIAGNOSIS — I87.2 VENOUS INSUFFICIENCY (CHRONIC) (PERIPHERAL): ICD-10-CM

## 2022-10-03 DIAGNOSIS — E11.51 TYPE II DIABETES MELLITUS WITH PERIPHERAL CIRCULATORY DISORDER (HCC): ICD-10-CM

## 2022-10-03 DIAGNOSIS — R26.2 DIFFICULTY WALKING: ICD-10-CM

## 2022-10-03 DIAGNOSIS — M79.674 PAIN OF TOE OF RIGHT FOOT: ICD-10-CM

## 2022-10-03 PROCEDURE — 11721 DEBRIDE NAIL 6 OR MORE: CPT | Performed by: PODIATRIST

## 2022-10-03 NOTE — PROGRESS NOTES
10/3/22     Myers [place]    : 1945  Sex: male  Age: 65 y.o. Subjective: The patient is seen today for evaluation regarding diabetic foot evaluation and mycotic nail care. No other complaints noted. Chief Complaint   Patient presents with    Nail Problem     Nail care       Current Medications:    Current Outpatient Medications:     doxazosin (CARDURA) 2 MG tablet, TAKE 1 TABLET BY MOUTH DAILY, Disp: , Rfl:     glipiZIDE (GLUCOTROL XL) 2.5 MG extended release tablet, TAKE 2 TABLETS BY MOUTH EVERY DAY, Disp: , Rfl:     spironolactone (ALDACTONE) 25 MG tablet, TAKE 1 TABLET BY MOUTH EVERY DAY, Disp: , Rfl:     vitamin D 25 MCG (1000 UT) CAPS, Take by mouth, Disp: , Rfl:     cloNIDine (CATAPRES) 0.1 MG tablet, TK 1 T PO TID, Disp: , Rfl: 3    hydroxychloroquine (PLAQUENIL) 200 MG tablet, TK 1 T PO BID, Disp: , Rfl: 3    metoprolol succinate (TOPROL XL) 25 MG extended release tablet, Take 3 tablets by mouth 2 times daily, Disp: 30 tablet, Rfl: 3    levothyroxine (SYNTHROID) 25 MCG tablet, Take 1 tablet by mouth Daily, Disp: 30 tablet, Rfl: 3    gabapentin (NEURONTIN) 300 MG capsule, Take 300 mg by mouth nightly. , Disp: , Rfl:     gabapentin (NEURONTIN) 300 MG capsule, Take 300 mg by mouth three times daily. ., Disp: , Rfl:     pravastatin (PRAVACHOL) 80 MG tablet, 80 mg daily , Disp: , Rfl: 3    lansoprazole (PREVACID) 15 MG capsule, Take 15 mg by mouth daily  , Disp: , Rfl:     metformin (GLUCOPHAGE) 500 MG tablet, Take 1,000 mg by mouth 2 times daily (with meals) , Disp: , Rfl:     allopurinol (ZYLOPRIM) 300 MG tablet, Take 300 mg by mouth daily. , Disp: , Rfl:     Allergies:   Allergies   Allergen Reactions    Lipitor [Atorvastatin Calcium]        Past Surgical History:   Procedure Laterality Date    BACK SURGERY  2004    Lumbar Decompression    CARDIAC SURGERY      Cardiac Stent    CARDIOVASCULAR STRESS TEST  05    Moderate Risk, 1st pass suggests anterior apical wma on exercise, cannot rule out ischemia    CARDIOVASCULAR STRESS TEST  2/20/07    EF 48% with no wma, normal LV fxn, slight enlargement of LV. CERVICAL FUSION  2004    DIAGNOSTIC CARDIAC CATH LAB PROCEDURE  1993    [facility]    DIAGNOSTIC CARDIAC CATH LAB PROCEDURE  12/27/2005    TAXUS Stent    DOPPLER ECHOCARDIOGRAPHY  4/6/02    LV dilated 6.6 cm, Mild RV enlargement, EF 45-50%    DOPPLER ECHOCARDIOGRAPHY  9/13/04    EF 55%, Concentric LVH, Left Atrial enlargement, Aortic Sclerosis. Mild MR      Past Medical History:   Diagnosis Date    CAD (coronary artery disease) 2005    Stent placement in the PDA with TAXUS Stent    Hypertension     PVCs (premature ventricular contractions) 2011    occasional    Ventricular ectopy 2010       Vitals:    10/03/22 1518   Weight: 260 lb (117.9 kg)   Height: 5' 10\" (1.778 m)       Exam:  Pedal pulses diminished to palpation bilateral foot. At this time the nail/s 1, 2, 3, 4, 5 right foot and nail/s 1, 2, 3, 4, 5 left foot are noted to be thickened, dystrophic and discolored with subungual debris present. Tenderness noted to palpation. Minimal hair growth is noted to both lower extremities. Edema noted with both varicosities and stasis skin changes present bilaterally. Coolness is noted to the digital regions to palpation. Capillary fill time delayed digital areas bilateral foot. No heel fissuring or macerations of the web spaces. No plantar calluses and/or ulcerative areas are noted. Patient is having difficulty with gait/walking. Plan Per Assessment  Lis Zimmerman was seen today for nail problem. Diagnoses and all orders for this visit:    Onychomycosis    Pain of toe of right foot    Pain of toe of left foot    Venous insufficiency (chronic) (peripheral)    Type II diabetes mellitus with peripheral circulatory disorder (HCC)    Difficulty walking        1. Evaluation and Management  2.  Manual and electrical debridement of the mycotic nails was performed for thickness and length to prevent injection and/or ulceration. 3. Discussed additional diabetic lower extremity care techniques with patient today. 4. It was discussed in detail with the patient proper caring for the vascular compromised foot. The fact that they have compromised blood flow put the patient at risk for infection/gangrene/amputation. The patient should not walk barefoot. Shoe gear should fit properly and socks should be worn with shoes. If any skin lesions are noted, they are instructed to contact the office immediately. 5. We will see the patient back at a later date for continued podiatric management and care. Patient was advised to call the office with any questions or concerns prior to their next appointment if needed. Seen By:    Jhonny Bumpers, DPM    Electronically signed by Jhonny Bumpers, DPM on 10/3/2022 at 3:41 PM      This note was created using voice recognition software. The note was reviewed however may contain grammatical errors.

## 2022-10-03 NOTE — PROGRESS NOTES
Patient in today for nail care. Patient does not have any complaints of pain at this time.  Patient's PCP is Caed Collazo MD date of last ov 9/3/22        Chanel Walker LPN